# Patient Record
Sex: FEMALE | Race: BLACK OR AFRICAN AMERICAN | Employment: FULL TIME | URBAN - METROPOLITAN AREA
[De-identification: names, ages, dates, MRNs, and addresses within clinical notes are randomized per-mention and may not be internally consistent; named-entity substitution may affect disease eponyms.]

---

## 2017-01-19 ENCOUNTER — OP HISTORICAL/CONVERTED ENCOUNTER (OUTPATIENT)
Dept: OTHER | Age: 42
End: 2017-01-19

## 2017-01-25 ENCOUNTER — ED HISTORICAL/CONVERTED ENCOUNTER (OUTPATIENT)
Dept: OTHER | Age: 42
End: 2017-01-25

## 2017-08-16 ENCOUNTER — OP HISTORICAL/CONVERTED ENCOUNTER (OUTPATIENT)
Dept: OTHER | Age: 42
End: 2017-08-16

## 2017-10-14 ENCOUNTER — ED HISTORICAL/CONVERTED ENCOUNTER (OUTPATIENT)
Dept: OTHER | Age: 42
End: 2017-10-14

## 2019-08-06 ENCOUNTER — OP HISTORICAL/CONVERTED ENCOUNTER (OUTPATIENT)
Dept: OTHER | Age: 44
End: 2019-08-06

## 2019-08-08 ENCOUNTER — OP HISTORICAL/CONVERTED ENCOUNTER (OUTPATIENT)
Dept: OTHER | Age: 44
End: 2019-08-08

## 2020-08-01 LAB
ALBUMIN SERPL-MCNC: 4 G/DL (ref 3.8–4.8)
ALBUMIN/GLOB SERPL: 1.3 {RATIO} (ref 1.2–2.2)
ALP SERPL-CCNC: 81 IU/L (ref 39–117)
ALT SERPL-CCNC: 28 IU/L (ref 0–32)
AST SERPL-CCNC: 19 IU/L (ref 0–40)
BASOPHILS # BLD AUTO: 0 X10E3/UL (ref 0–0.2)
BASOPHILS NFR BLD AUTO: 0 %
BILIRUB SERPL-MCNC: 0.3 MG/DL (ref 0–1.2)
BUN SERPL-MCNC: 13 MG/DL (ref 6–24)
BUN/CREAT SERPL: 15 (ref 9–23)
CALCIUM SERPL-MCNC: 8.9 MG/DL (ref 8.7–10.2)
CHLORIDE SERPL-SCNC: 102 MMOL/L (ref 96–106)
CHOLEST SERPL-MCNC: 193 MG/DL (ref 100–199)
CO2 SERPL-SCNC: 24 MMOL/L (ref 20–29)
CREAT SERPL-MCNC: 0.88 MG/DL (ref 0.57–1)
EOSINOPHIL # BLD AUTO: 0.2 X10E3/UL (ref 0–0.4)
EOSINOPHIL NFR BLD AUTO: 2 %
ERYTHROCYTE [DISTWIDTH] IN BLOOD BY AUTOMATED COUNT: 17.2 % (ref 11.7–15.4)
GLOBULIN SER CALC-MCNC: 3.2 G/DL (ref 1.5–4.5)
GLUCOSE SERPL-MCNC: 110 MG/DL (ref 65–99)
HBA1C MFR BLD: 6.4 % (ref 4.8–5.6)
HCT VFR BLD AUTO: 38.5 % (ref 34–46.6)
HDLC SERPL-MCNC: 63 MG/DL
HGB BLD-MCNC: 12.1 G/DL (ref 11.1–15.9)
IMM GRANULOCYTES # BLD AUTO: 0 X10E3/UL (ref 0–0.1)
IMM GRANULOCYTES NFR BLD AUTO: 0 %
LDLC SERPL CALC-MCNC: 114 MG/DL (ref 0–99)
LDLC/HDLC SERPL: 1.8 RATIO (ref 0–3.2)
LYMPHOCYTES # BLD AUTO: 2.1 X10E3/UL (ref 0.7–3.1)
LYMPHOCYTES NFR BLD AUTO: 26 %
MCH RBC QN AUTO: 26.5 PG (ref 26.6–33)
MCHC RBC AUTO-ENTMCNC: 31.4 G/DL (ref 31.5–35.7)
MCV RBC AUTO: 84 FL (ref 79–97)
MONOCYTES # BLD AUTO: 0.6 X10E3/UL (ref 0.1–0.9)
MONOCYTES NFR BLD AUTO: 7 %
NEUTROPHILS # BLD AUTO: 5.1 X10E3/UL (ref 1.4–7)
NEUTROPHILS NFR BLD AUTO: 65 %
PLATELET # BLD AUTO: 237 X10E3/UL (ref 150–450)
POTASSIUM SERPL-SCNC: 4.4 MMOL/L (ref 3.5–5.2)
PROT SERPL-MCNC: 7.2 G/DL (ref 6–8.5)
RBC # BLD AUTO: 4.56 X10E6/UL (ref 3.77–5.28)
SODIUM SERPL-SCNC: 139 MMOL/L (ref 134–144)
TRIGL SERPL-MCNC: 80 MG/DL (ref 0–149)
VLDLC SERPL CALC-MCNC: 16 MG/DL (ref 5–40)
WBC # BLD AUTO: 8 X10E3/UL (ref 3.4–10.8)

## 2020-08-03 DIAGNOSIS — R73.03 PREDIABETES: Primary | ICD-10-CM

## 2020-08-03 RX ORDER — METFORMIN HYDROCHLORIDE 500 MG/1
1 TABLET, FILM COATED, EXTENDED RELEASE ORAL
Qty: 30 TAB | Refills: 2 | Status: SHIPPED | OUTPATIENT
Start: 2020-08-03 | End: 2020-11-01

## 2020-08-03 NOTE — PROGRESS NOTES
See results note      ICD-10-CM ICD-9-CM    1.  Prediabetes  R73.03 790.29 metFORMIN (GLUMETZA ER) 500 mg TG24 24 hour tablet

## 2020-08-04 PROBLEM — M25.569 KNEE PAIN: Status: ACTIVE | Noted: 2020-08-04

## 2020-08-04 PROBLEM — R11.0 NAUSEA: Status: ACTIVE | Noted: 2020-08-04

## 2020-08-04 PROBLEM — G45.9 TRANSIENT CEREBRAL ISCHEMIA: Status: ACTIVE | Noted: 2020-08-04

## 2020-08-04 PROBLEM — R60.9 EDEMA: Status: ACTIVE | Noted: 2020-08-04

## 2020-08-04 PROBLEM — B35.1 ONYCHOMYCOSIS: Status: ACTIVE | Noted: 2020-08-04

## 2020-08-04 PROBLEM — J45.909 ALLERGIC BRONCHITIS: Status: ACTIVE | Noted: 2020-08-04

## 2020-08-04 PROBLEM — R03.0 ELEVATED BLOOD PRESSURE READING: Status: ACTIVE | Noted: 2020-08-04

## 2020-08-04 PROBLEM — R63.5 ABNORMAL WEIGHT GAIN: Status: ACTIVE | Noted: 2020-08-04

## 2020-08-04 PROBLEM — R73.03 PREDIABETES: Status: ACTIVE | Noted: 2020-08-04

## 2020-08-04 PROBLEM — H66.90 OTITIS: Status: ACTIVE | Noted: 2020-08-04

## 2020-08-04 PROBLEM — N39.41 URGE INCONTINENCE OF URINE: Status: ACTIVE | Noted: 2020-08-04

## 2020-08-04 PROBLEM — J20.9 ACUTE BRONCHITIS: Status: ACTIVE | Noted: 2020-08-04

## 2020-08-04 PROBLEM — J18.9 PNEUMONIA: Status: ACTIVE | Noted: 2020-08-04

## 2020-08-04 PROBLEM — G47.33 OBSTRUCTIVE SLEEP APNEA SYNDROME: Status: ACTIVE | Noted: 2020-08-04

## 2020-08-04 PROBLEM — R51.9 HEADACHE: Status: ACTIVE | Noted: 2020-08-04

## 2020-08-04 PROBLEM — I10 ESSENTIAL HYPERTENSION: Status: ACTIVE | Noted: 2020-08-04

## 2020-08-04 PROBLEM — G43.909 MIGRAINE: Status: ACTIVE | Noted: 2020-08-04

## 2020-08-05 ENCOUNTER — OFFICE VISIT (OUTPATIENT)
Dept: INTERNAL MEDICINE CLINIC | Age: 45
End: 2020-08-05
Payer: COMMERCIAL

## 2020-08-05 VITALS
WEIGHT: 293 LBS | OXYGEN SATURATION: 98 % | DIASTOLIC BLOOD PRESSURE: 77 MMHG | BODY MASS INDEX: 44.41 KG/M2 | HEART RATE: 64 BPM | HEIGHT: 68 IN | TEMPERATURE: 97.6 F | SYSTOLIC BLOOD PRESSURE: 138 MMHG

## 2020-08-05 DIAGNOSIS — M54.50 CHRONIC RIGHT-SIDED LOW BACK PAIN WITHOUT SCIATICA: Primary | ICD-10-CM

## 2020-08-05 DIAGNOSIS — E66.01 OBESITY, MORBID (HCC): ICD-10-CM

## 2020-08-05 DIAGNOSIS — G89.29 CHRONIC RIGHT-SIDED LOW BACK PAIN WITHOUT SCIATICA: Primary | ICD-10-CM

## 2020-08-05 PROCEDURE — 99213 OFFICE O/P EST LOW 20 MIN: CPT | Performed by: INTERNAL MEDICINE

## 2020-08-05 RX ORDER — MELOXICAM 7.5 MG/1
TABLET ORAL
COMMUNITY
Start: 2020-06-15 | End: 2020-11-09 | Stop reason: ALTCHOICE

## 2020-08-05 RX ORDER — TOPIRAMATE 50 MG/1
TABLET, FILM COATED ORAL
COMMUNITY
Start: 2020-07-01 | End: 2020-10-07 | Stop reason: ALTCHOICE

## 2020-08-05 RX ORDER — TOLTERODINE TARTRATE 2 MG/1
TABLET, EXTENDED RELEASE ORAL
COMMUNITY
Start: 2020-05-07 | End: 2021-03-31 | Stop reason: SDUPTHER

## 2020-08-05 RX ORDER — TRIAMTERENE AND HYDROCHLOROTHIAZIDE 37.5; 25 MG/1; MG/1
1 CAPSULE ORAL DAILY
COMMUNITY
Start: 2020-07-03 | End: 2021-02-12 | Stop reason: ALTCHOICE

## 2020-08-05 RX ORDER — LORATADINE 10 MG/1
10 TABLET ORAL
COMMUNITY
End: 2021-03-31 | Stop reason: SDUPTHER

## 2020-08-05 RX ORDER — ALBUTEROL SULFATE 0.83 MG/ML
2.5 SOLUTION RESPIRATORY (INHALATION)
COMMUNITY
End: 2020-10-07 | Stop reason: SDUPTHER

## 2020-08-05 RX ORDER — ATORVASTATIN CALCIUM 10 MG/1
TABLET, FILM COATED ORAL
COMMUNITY
Start: 2020-07-01 | End: 2021-09-22 | Stop reason: ALTCHOICE

## 2020-08-05 NOTE — PROGRESS NOTES
Akiko Watts is a 40 y.o. female and presents with Hypertension; Cholesterol Problem; Asthma; Headache; and Side Pain    aleksandar bates visit was in July 10:  \"migraine   Recommendations on migraine prevention given, start sumatriptan. If this persists does not improve consider work-up for intracranial hypertension  Start SUMAtriptan 50 mg tablet Take 1 tablet as needed for headache, can repeat dose once, no more than 2 tablets per day   essential hypertension   No alcohol aspirin when she mentions, increase lisinopril from 5 mg to 10 mg. Educated on importance of low-salt, high potassium diet, weight loss, exercise. Start lisinopriL 10 mg tablet 1 tablet every day for 30 days   prediabetes   Check hemoglobin [id number]   Check comp. metabolic panel [id number]   Check CBC with differential/platelet   mixed hyperlipidemia   Check lipid panel with LDL/HDL [id number]    Globin A1c was 6.4%, cholesterol LDL mildly elevated, all other results within normal limits, results reviewed with her today. She says migraines are better, not as frequent, and sumatriptan helps. Blood pressure is better controlled, she has not been taking lisinopril, she is only taking Dyazide. Pain in the right side of the bback intermittent and sporadic, she has no idea what makes it worse. Someitimes she feels relief of it when she moves her bowels, every ohhter day, no straining, but she says not a lot comes out, no blood in the stools          Review of Systems  Review of Systems   Constitutional: Negative for chills, fatigue, fever and unexpected weight change. HENT: Negative for congestion, ear pain, sneezing and sore throat. Eyes: Negative for pain and discharge. Respiratory: Negative for cough, shortness of breath and wheezing. Cardiovascular: Negative for chest pain, palpitations and leg swelling. Gastrointestinal: Negative for abdominal pain, blood in stool, constipation and diarrhea.    Endocrine: Negative for polydipsia and polyuria. Genitourinary: Negative for difficulty urinating, dysuria, frequency, hematuria and urgency. Musculoskeletal: Positive for back pain. Negative for arthralgias and joint swelling. Skin: Negative for rash. Allergic/Immunologic: Negative for environmental allergies and food allergies. Neurological: Negative for dizziness, speech difficulty, weakness, light-headedness, numbness and headaches. Hematological: Negative for adenopathy. Psychiatric/Behavioral: Negative for behavioral problems (Depression), sleep disturbance and suicidal ideas. Past Medical History:   Diagnosis Date    Asthma     Hypertension     Obesity     S/P hysterectomy     May 2010     Past Surgical History:   Procedure Laterality Date    HX GYN      excision of bartholins gland    HX HYSTERECTOMY       Social History     Socioeconomic History    Marital status:      Spouse name: Not on file    Number of children: Not on file    Years of education: Not on file    Highest education level: Not on file   Tobacco Use    Smoking status: Never Smoker    Smokeless tobacco: Never Used   Substance and Sexual Activity    Alcohol use: Yes     Comment: wine for special occasions    Drug use: No     Family History   Problem Relation Age of Onset    Diabetes Maternal Grandmother     Coronary Artery Disease Maternal Grandmother     Diabetes Maternal Grandfather     Coronary Artery Disease Maternal Grandfather     Cancer Maternal Grandfather     Coronary Artery Disease Paternal Grandmother     Coronary Artery Disease Paternal Grandfather     Hypertension Father      Current Outpatient Medications   Medication Sig Dispense Refill    atorvastatin (LIPITOR) 10 mg tablet TAKE 1 TABLET (10 MG) BY MOUTH DAILY IN THE EVENING FOR HIGH CHOLESTEROL      triamterene-hydroCHLOROthiazide (DYAZIDE) 37.5-25 mg per capsule Take 1 Cap by mouth daily.       meloxicam (MOBIC) 7.5 mg tablet TAKE 1 TABLET BY MOUTH EVERY DAY      tolterodine (DETROL) 2 mg tablet TAKE 1 TABLET BY MOUTH EVERY DAY      topiramate (TOPAMAX) 50 mg tablet TAKE 1 TABLET BY MOUTH TWICE A DAY      loratadine (Claritin) 10 mg tablet Take 10 mg by mouth daily.  albuterol (PROVENTIL VENTOLIN) 2.5 mg /3 mL (0.083 %) nebu 2.5 mg by Nebulization route every four (4) hours as needed for Wheezing.  metFORMIN (GLUMETZA ER) 500 mg TG24 24 hour tablet Take 500 mg by mouth daily (with breakfast) for 90 days. 30 Tab 2    estradiol (ESTRACE) 1 mg tablet Take 1 mg by mouth daily. Allergies   Allergen Reactions    Penicillins Hives       Objective:  Visit Vitals  /77 (BP 1 Location: Right arm, BP Patient Position: Sitting)   Pulse 64   Temp 97.6 °F (36.4 °C) (Oral)   Ht 5' 8\" (1.727 m)   Wt (!) 362 lb 12.8 oz (164.6 kg)   SpO2 98% Comment: RA   BMI 55.16 kg/m²     Physical Exam:   Physical Exam  Constitutional:       General: She is not in acute distress. Appearance: Normal appearance. She is morbidly obese. HENT:      Head: Normocephalic and atraumatic. Mouth/Throat:      Mouth: Mucous membranes are moist.   Eyes:      Extraocular Movements: Extraocular movements intact. Conjunctiva/sclera: Conjunctivae normal.      Pupils: Pupils are equal, round, and reactive to light. Neck:      Musculoskeletal: Normal range of motion and neck supple. Cardiovascular:      Rate and Rhythm: Normal rate and regular rhythm. Pulses: Normal pulses. Heart sounds: Normal heart sounds. Pulmonary:      Effort: Pulmonary effort is normal.      Breath sounds: Normal breath sounds. Abdominal:      General: Abdomen is flat. Bowel sounds are normal. There is no distension. Palpations: Abdomen is soft. There is no mass. Tenderness: There is no abdominal tenderness. Musculoskeletal:         General: Tenderness (Right lower back lateral) present. No swelling or deformity. Right lower leg: No edema. Left lower leg: No edema. Lymphadenopathy:      Cervical: No cervical adenopathy. Skin:     General: Skin is warm and dry. Capillary Refill: Capillary refill takes less than 2 seconds. Coloration: Skin is not jaundiced or pale. Findings: No erythema or rash. Neurological:      General: No focal deficit present. Mental Status: She is alert and oriented to person, place, and time. Psychiatric:         Mood and Affect: Mood normal.         Behavior: Behavior normal.         Thought Content: Thought content normal.         Judgment: Judgment normal.          Results for orders placed or performed in visit on 07/31/20   CBC WITH AUTOMATED DIFF   Result Value Ref Range    WBC 8.0 3.4 - 10.8 x10E3/uL    RBC 4.56 3.77 - 5.28 x10E6/uL    HGB 12.1 11.1 - 15.9 g/dL    HCT 38.5 34.0 - 46.6 %    MCV 84 79 - 97 fL    MCH 26.5 (L) 26.6 - 33.0 pg    MCHC 31.4 (L) 31.5 - 35.7 g/dL    RDW 17.2 (H) 11.7 - 15.4 %    PLATELET 071 661 - 352 x10E3/uL    NEUTROPHILS 65 Not Estab. %    Lymphocytes 26 Not Estab. %    MONOCYTES 7 Not Estab. %    EOSINOPHILS 2 Not Estab. %    BASOPHILS 0 Not Estab. %    ABS. NEUTROPHILS 5.1 1.4 - 7.0 x10E3/uL    Abs Lymphocytes 2.1 0.7 - 3.1 x10E3/uL    ABS. MONOCYTES 0.6 0.1 - 0.9 x10E3/uL    ABS. EOSINOPHILS 0.2 0.0 - 0.4 x10E3/uL    ABS. BASOPHILS 0.0 0.0 - 0.2 x10E3/uL    IMMATURE GRANULOCYTES 0 Not Estab. %    ABS. IMM.  GRANS. 0.0 0.0 - 0.1 X29A6/VT   METABOLIC PANEL, COMPREHENSIVE   Result Value Ref Range    Glucose 110 (H) 65 - 99 mg/dL    BUN 13 6 - 24 mg/dL    Creatinine 0.88 0.57 - 1.00 mg/dL    GFR est non-AA 80 >59 mL/min/1.73    GFR est AA 92 >59 mL/min/1.73    BUN/Creatinine ratio 15 9 - 23    Sodium 139 134 - 144 mmol/L    Potassium 4.4 3.5 - 5.2 mmol/L    Chloride 102 96 - 106 mmol/L    CO2 24 20 - 29 mmol/L    Calcium 8.9 8.7 - 10.2 mg/dL    Protein, total 7.2 6.0 - 8.5 g/dL    Albumin 4.0 3.8 - 4.8 g/dL    GLOBULIN, TOTAL 3.2 1.5 - 4.5 g/dL    A-G Ratio 1.3 1.2 - 2.2    Bilirubin, total 0.3 0.0 - 1.2 mg/dL    Alk. phosphatase 81 39 - 117 IU/L    AST (SGOT) 19 0 - 40 IU/L    ALT (SGPT) 28 0 - 32 IU/L   LIPID PANEL WITH LDL/HDL RATIO   Result Value Ref Range    Cholesterol, total 193 100 - 199 mg/dL    Triglyceride 80 0 - 149 mg/dL    HDL Cholesterol 63 >39 mg/dL    VLDL, calculated 16 5 - 40 mg/dL    LDL, calculated 114 (H) 0 - 99 mg/dL    LDL/HDL Ratio 1.8 0.0 - 3.2 ratio   HEMOGLOBIN A1C W/O EAG   Result Value Ref Range    Hemoglobin A1c 6.4 (H) 4.8 - 5.6 %       Assessment/Plan:    Her back pain has been present for more than 2 years, she benefits from physical therapy, findings in the physical exam are compatible with musculoskeletal etiology, no need for imaging at all, I explained her. Counseled her on diet,1200 calorie diet given, recommended brisk walking 30 to 40 mins. goal to lose 5% of her weight in 3 months. Hypertension is well controlled, continue Dyazide with no changes. Since she has not been taking the lisinopril there is been no need to think about adding at this time. ICD-10-CM ICD-9-CM    1. Chronic right-sided low back pain without sciatica  M54.5 724.2 REFERRAL TO PHYSICAL THERAPY    G89.29 338.29    2. Obesity, morbid ([facility] [address]  E66.01 278.01      Orders Placed This Encounter    REFERRAL TO PHYSICAL THERAPY     Referral Priority:   Routine     Referral Type:   PT/OT/ST     Referral Reason:   Specialty Services Required     Number of Visits Requested:   1    atorvastatin (LIPITOR) 10 mg tablet     Sig: TAKE 1 TABLET (10 MG) BY MOUTH DAILY IN THE EVENING FOR HIGH CHOLESTEROL    triamterene-hydroCHLOROthiazide (DYAZIDE) 37.5-25 mg per capsule     Sig: Take 1 Cap by mouth daily.     meloxicam (MOBIC) 7.5 mg tablet     Sig: TAKE 1 TABLET BY MOUTH EVERY DAY    tolterodine (DETROL) 2 mg tablet     Sig: TAKE 1 TABLET BY MOUTH EVERY DAY    topiramate (TOPAMAX) 50 mg tablet     Sig: TAKE 1 TABLET BY MOUTH TWICE A DAY    loratadine (Claritin) 10 mg tablet     Sig: Take 10 mg by mouth daily.  albuterol (PROVENTIL VENTOLIN) 2.5 mg /3 mL (0.083 %) nebu     Si.5 mg by Nebulization route every four (4) hours as needed for Wheezing. There are no Patient Instructions on file for this visit. Follow-up and Dispositions    · Return in about 3 months (around 2020).

## 2020-10-07 ENCOUNTER — VIRTUAL VISIT (OUTPATIENT)
Dept: INTERNAL MEDICINE CLINIC | Age: 45
End: 2020-10-07
Payer: COMMERCIAL

## 2020-10-07 DIAGNOSIS — J45.41 MODERATE PERSISTENT ASTHMA WITH ACUTE EXACERBATION: Primary | ICD-10-CM

## 2020-10-07 PROCEDURE — 99213 OFFICE O/P EST LOW 20 MIN: CPT | Performed by: INTERNAL MEDICINE

## 2020-10-07 RX ORDER — PREDNISONE 50 MG/1
50 TABLET ORAL DAILY
Qty: 7 TAB | Refills: 0 | Status: SHIPPED | OUTPATIENT
Start: 2020-10-07 | End: 2020-10-14

## 2020-10-07 RX ORDER — ALBUTEROL SULFATE 90 UG/1
1 AEROSOL, METERED RESPIRATORY (INHALATION)
Qty: 1 INHALER | Refills: 3 | Status: SHIPPED | OUTPATIENT
Start: 2020-10-07

## 2020-10-07 RX ORDER — ALBUTEROL SULFATE 0.83 MG/ML
2.5 SOLUTION RESPIRATORY (INHALATION)
Qty: 30 NEBULE | Refills: 2 | Status: SHIPPED | OUTPATIENT
Start: 2020-10-07 | End: 2022-01-28 | Stop reason: SDUPTHER

## 2020-10-07 NOTE — PROGRESS NOTES
Chief Complaint   Patient presents with    Asthma    Breathing Problem     Pt states that her asthma has been acting up since Sunday. She said that she is required to wear a mask at work and it is making her asthma worse. She has been doing her albuterol neb every 4 hours and has run out of albuterol.

## 2020-10-07 NOTE — PATIENT INSTRUCTIONS

## 2020-10-07 NOTE — PROGRESS NOTES
Fawn Dowd is a 40 y.o. female and presents with Asthma and Breathing Problem    THIS VISIT WAS COMPLETED VIRTUALLY VIA DOXY. ME WITH PATIENTS CONSENT IN THE SETTING OF CORONAVIRUS PANDEMIC     THIS VISIT WAS COMPLETED VIRTUALLY VIA DOXY. ME WITH PATIENTS CONSENT IN THE SETTING OF CORONAVIRUS PANDEMIC     She says since 4 days ago her asthma is not controlled. She says she feels wearing a facemask at work makes it worse. She has been using the nebulizer every 4 hrous since 3 days ago. She feels chest tightness, having wheezing daily, she says than once she uses the nebulizer her wheezing improves. Her main issue is the chest tightness and sob, little dry cough, not a lot she says, no fever, no chills, no malaise, no myalgias. She's using the symbicort once a day only        Review of Systems  Review of Systems   Constitutional: Negative for chills, fatigue, fever and unexpected weight change. HENT: Negative for congestion, ear pain, sneezing and sore throat. Eyes: Negative for pain and discharge. Respiratory: Negative for cough, shortness of breath and wheezing. Cardiovascular: Negative for chest pain, palpitations and leg swelling. Gastrointestinal: Negative for abdominal pain, blood in stool, constipation and diarrhea. Endocrine: Negative for polydipsia and polyuria. Genitourinary: Negative for difficulty urinating, dysuria, frequency, hematuria and urgency. Musculoskeletal: Negative for arthralgias, back pain and joint swelling. Skin: Negative for rash. Allergic/Immunologic: Negative for environmental allergies and food allergies. Neurological: Negative for dizziness, speech difficulty, weakness, light-headedness, numbness and headaches. Hematological: Negative for adenopathy. Psychiatric/Behavioral: Negative for behavioral problems (Depression), sleep disturbance and suicidal ideas.           Past Medical History:   Diagnosis Date    Asthma     Hypertension     Obesity     S/P hysterectomy     May 2010     Past Surgical History:   Procedure Laterality Date    HX GYN      excision of bartholins gland    HX HYSTERECTOMY       Social History     Socioeconomic History    Marital status:      Spouse name: Not on file    Number of children: Not on file    Years of education: Not on file    Highest education level: Not on file   Tobacco Use    Smoking status: Never Smoker    Smokeless tobacco: Never Used   Substance and Sexual Activity    Alcohol use: Yes     Comment: wine for special occasions    Drug use: No     Family History   Problem Relation Age of Onset    Diabetes Maternal Grandmother     Coronary Artery Disease Maternal Grandmother     Diabetes Maternal Grandfather     Coronary Artery Disease Maternal Grandfather     Cancer Maternal Grandfather     Coronary Artery Disease Paternal Grandmother     Coronary Artery Disease Paternal Grandfather     Hypertension Father      Current Outpatient Medications   Medication Sig Dispense Refill    albuterol (PROVENTIL HFA, VENTOLIN HFA, PROAIR HFA) 90 mcg/actuation inhaler Take 1 Puff by inhalation every six (6) hours as needed for Wheezing. 1 Inhaler 3    predniSONE (DELTASONE) 50 mg tablet Take 1 Tab by mouth daily for 7 days. 7 Tab 0    albuterol (PROVENTIL VENTOLIN) 2.5 mg /3 mL (0.083 %) nebu 3 mL by Nebulization route every six (6) hours as needed for Wheezing or Shortness of Breath for up to 90 days. 30 Nebule 2    atorvastatin (LIPITOR) 10 mg tablet TAKE 1 TABLET (10 MG) BY MOUTH DAILY IN THE EVENING FOR HIGH CHOLESTEROL      triamterene-hydroCHLOROthiazide (DYAZIDE) 37.5-25 mg per capsule Take 1 Cap by mouth daily.  meloxicam (MOBIC) 7.5 mg tablet TAKE 1 TABLET BY MOUTH EVERY DAY      tolterodine (DETROL) 2 mg tablet TAKE 1 TABLET BY MOUTH EVERY DAY      loratadine (Claritin) 10 mg tablet Take 10 mg by mouth daily.       metFORMIN (GLUMETZA ER) 500 mg TG24 24 hour tablet Take 500 mg by mouth daily (with breakfast) for 90 days. 30 Tab 2     Allergies   Allergen Reactions    Penicillins Hives       Objective: There were no vitals taken for this visit. Physical Exam:   Physical Exam  Vitals signs and nursing note reviewed. Constitutional:       Appearance: Normal appearance. HENT:      Head: Normocephalic and atraumatic. Eyes:      General: No scleral icterus. Conjunctiva/sclera: Conjunctivae normal.      Pupils: Pupils are equal, round, and reactive to light. Neck:      Musculoskeletal: Normal range of motion. Skin:     Coloration: Skin is not jaundiced or pale. Findings: No rash. Neurological:      Mental Status: She is alert and oriented to person, place, and time. Psychiatric:         Mood and Affect: Mood normal.         Behavior: Behavior normal.         Thought Content: Thought content normal.         Judgment: Judgment normal.          Results for orders placed or performed in visit on 07/31/20   CBC WITH AUTOMATED DIFF   Result Value Ref Range    WBC 8.0 3.4 - 10.8 x10E3/uL    RBC 4.56 3.77 - 5.28 x10E6/uL    HGB 12.1 11.1 - 15.9 g/dL    HCT 38.5 34.0 - 46.6 %    MCV 84 79 - 97 fL    MCH 26.5 (L) 26.6 - 33.0 pg    MCHC 31.4 (L) 31.5 - 35.7 g/dL    RDW 17.2 (H) 11.7 - 15.4 %    PLATELET 896 884 - 219 x10E3/uL    NEUTROPHILS 65 Not Estab. %    Lymphocytes 26 Not Estab. %    MONOCYTES 7 Not Estab. %    EOSINOPHILS 2 Not Estab. %    BASOPHILS 0 Not Estab. %    ABS. NEUTROPHILS 5.1 1.4 - 7.0 x10E3/uL    Abs Lymphocytes 2.1 0.7 - 3.1 x10E3/uL    ABS. MONOCYTES 0.6 0.1 - 0.9 x10E3/uL    ABS. EOSINOPHILS 0.2 0.0 - 0.4 x10E3/uL    ABS. BASOPHILS 0.0 0.0 - 0.2 x10E3/uL    IMMATURE GRANULOCYTES 0 Not Estab. %    ABS. IMM.  GRANS. 0.0 0.0 - 0.1 [id number]/SN   METABOLIC PANEL, COMPREHENSIVE   Result Value Ref Range    Glucose 110 (H) 65 - 99 mg/dL    BUN 13 6 - 24 mg/dL    Creatinine 0.88 0.57 - 1.00 mg/dL    GFR est non-AA 80 >59 mL/min/1.73    GFR est AA 92 >59 mL/min/1.73    BUN/Creatinine ratio 15 9 - 23    Sodium 139 134 - 144 mmol/L    Potassium 4.4 3.5 - 5.2 mmol/L    Chloride 102 96 - 106 mmol/L    CO2 24 20 - 29 mmol/L    Calcium 8.9 8.7 - 10.2 mg/dL    Protein, total 7.2 6.0 - 8.5 g/dL    Albumin 4.0 3.8 - 4.8 g/dL    GLOBULIN, TOTAL 3.2 1.5 - 4.5 g/dL    A-G Ratio 1.3 1.2 - 2.2    Bilirubin, total 0.3 0.0 - 1.2 mg/dL    Alk. phosphatase 81 39 - 117 IU/L    AST (SGOT) 19 0 - 40 IU/L    ALT (SGPT) 28 0 - 32 IU/L   LIPID PANEL WITH LDL/HDL RATIO   Result Value Ref Range    Cholesterol, total 193 100 - 199 mg/dL    Triglyceride 80 0 - 149 mg/dL    HDL Cholesterol 63 >39 mg/dL    VLDL, calculated 16 5 - 40 mg/dL    LDL, calculated 114 (H) 0 - 99 mg/dL    LDL/HDL Ratio 1.8 0.0 - 3.2 ratio   HEMOGLOBIN A1C W/O EAG   Result Value Ref Range    Hemoglobin A1c 6.4 (H) 4.8 - 5.6 %       Assessment/Plan:    Acute asthma exacerbation judging by her symptoms, do short course of steroids, educated about proper se of symbicort and to use it twice a day, needs refill of albuterol. Signs of alarm provided       ICD-10-CM ICD-9-CM    1. Moderate persistent asthma with acute exacerbation  J45.41 493.92 albuterol (PROVENTIL HFA, VENTOLIN HFA, PROAIR HFA) 90 mcg/actuation inhaler      predniSONE (DELTASONE) 50 mg tablet      albuterol (PROVENTIL VENTOLIN) 2.5 mg /3 mL (0.083 %) nebu     Orders Placed This Encounter    albuterol (PROVENTIL HFA, VENTOLIN HFA, PROAIR HFA) 90 mcg/actuation inhaler     Sig: Take 1 Puff by inhalation every six (6) hours as needed for Wheezing. Dispense:  1 Inhaler     Refill:  3    predniSONE (DELTASONE) 50 mg tablet     Sig: Take 1 Tab by mouth daily for 7 days. Dispense:  7 Tab     Refill:  0    albuterol (PROVENTIL VENTOLIN) 2.5 mg /3 mL (0.083 %) nebu     Sig: 3 mL by Nebulization route every six (6) hours as needed for Wheezing or Shortness of Breath for up to 90 days.      Dispense:  30 Nebule     Refill:  2       Patient Instructions        Asthma Attack: Care Instructions  Your Care Instructions     During an asthma attack, the airways swell and narrow. This makes it hard to breathe. Severe asthma attacks can be life-threatening, but you can help prevent them by keeping your asthma under control and treating symptoms before they get bad. Symptoms include being short of breath, having chest tightness, coughing, and wheezing. Noting and treating these symptoms can also help you avoid future trips to the emergency room. The doctor has checked you carefully, but problems can develop later. If you notice any problems or new symptoms, get medical treatment right away. Follow-up care is a key part of your treatment and safety. Be sure to make and go to all appointments, and call your doctor if you are having problems. It's also a good idea to know your test results and keep a list of the medicines you take. How can you care for yourself at home? · Follow your asthma action plan to prevent and treat attacks. If you don't have an asthma action plan, work with your doctor to create one. · Take your asthma medicines exactly as prescribed. Talk to your doctor right away if you have any questions about how to take them. ? Use your quick-relief medicine when you have symptoms of an attack. Quick-relief medicine is usually an albuterol inhaler. Some people need to use quick-relief medicine before they exercise. ? Take your controller medicine every day, not just when you have symptoms. Controller medicine is usually an inhaled corticosteroid. The goal is to prevent problems before they occur. Don't use your controller medicine to treat an attack that has already started. It doesn't work fast enough to help. ? If your doctor prescribed corticosteroid pills to use during an attack, take them exactly as prescribed. It may take hours for the pills to work, but they may make the episode shorter and help you breathe better. ?  Keep your quick-relief medicine with you at all times.  · Talk to your doctor before using other medicines. Some medicines, such as aspirin, can cause asthma attacks in some people. · If you have a peak flow meter, use it to check how well you are breathing. This can help you predict when an asthma attack is going to occur. Then you can take medicine to prevent the asthma attack or make it less severe. · Do not smoke or allow others to smoke around you. Avoid smoky places. Smoking makes asthma worse. If you need help quitting, talk to your doctor about stop-smoking programs and medicines. These can increase your chances of quitting for good. · Learn what triggers an asthma attack for you, and avoid the triggers when you can. Common triggers include colds, smoke, air pollution, dust, pollen, mold, pets, cockroaches, stress, and cold air. · Avoid colds and the flu. Get a pneumococcal vaccine shot. If you have had one before, ask your doctor if you need a second dose. Get a flu vaccine every fall. If you must be around people with colds or the flu, wash your hands often. When should you call for help? [id number] anytime you think you may need emergency care. For example, call if:  · You have severe trouble breathing. Call your doctor now or seek immediate medical care if:  · Your symptoms do not get better after you have followed your asthma action plan. · You have new or worse trouble breathing. · Your coughing and wheezing get worse. · You cough up dark brown or bloody mucus (sputum). · You have a new or higher fever. Watch closely for changes in your health, and be sure to contact your doctor if:  · You need to use quick-relief medicine on more than 2 days a week (unless it is just for exercise). · You cough more deeply or more often, especially if you notice more mucus or a change in the color of your mucus. · You are not getting better as expected. Where can you learn more?   Go to http://www.gray.com/  Enter [id number] in the search box to learn more about \"Asthma Attack: Care Instructions. \"  Current as of: February 24, 2020               Content Version: 12.5  © 2006-2020 [employer]. Care instructions adapted under license by [employer] (which disclaims liability or warranty for this information). If you have questions about a medical condition or this instruction, always ask your healthcare professional. [place] [address] any warranty or liability for your use of this information.

## 2020-10-07 NOTE — LETTER
NOTIFICATION RETURN TO WORK / SCHOOL 
 
10/7/2020 4:22 PM 
 
Ms. [facility] [phone] To Whom It May Concern: 
 
[facility] is currently under the care of [address] [place] [address]. She will return to workon: 10/12/2020 If there are questions or concerns please have the patient contact our office. Sincerely, Chang Deutsch MD

## 2020-11-09 ENCOUNTER — VIRTUAL VISIT (OUTPATIENT)
Dept: INTERNAL MEDICINE CLINIC | Age: 45
End: 2020-11-09
Payer: COMMERCIAL

## 2020-11-09 DIAGNOSIS — J45.41 MODERATE PERSISTENT ASTHMA WITH ACUTE EXACERBATION: Primary | ICD-10-CM

## 2020-11-09 DIAGNOSIS — E66.01 OBESITY, MORBID (HCC): ICD-10-CM

## 2020-11-09 PROCEDURE — 99213 OFFICE O/P EST LOW 20 MIN: CPT | Performed by: INTERNAL MEDICINE

## 2020-11-09 RX ORDER — FLUTICASONE PROPIONATE 110 UG/1
2 AEROSOL, METERED RESPIRATORY (INHALATION) EVERY 12 HOURS
Qty: 1 INHALER | Refills: 1 | Status: SHIPPED | OUTPATIENT
Start: 2020-11-09 | End: 2022-01-28 | Stop reason: ALTCHOICE

## 2020-11-09 RX ORDER — MELOXICAM 7.5 MG/1
TABLET ORAL
COMMUNITY
Start: 2020-07-13 | End: 2020-11-09 | Stop reason: ALTCHOICE

## 2020-11-09 RX ORDER — METFORMIN HYDROCHLORIDE 500 MG/1
TABLET ORAL
COMMUNITY
Start: 2020-01-30 | End: 2021-03-12 | Stop reason: SDUPTHER

## 2020-11-09 NOTE — PROGRESS NOTES
Ninoska Mullins is a 40 y.o. female and presents with Asthma (asthma) and Obesity    THIS VISIT WAS COMPLETED VIRTUALLY VIA DOXY. ME WITH PATIENTS CONSENT IN THE SETTING OF CORONAVIRUS PANDEMIC     Last visit she was having asthma exacerbation, we did a short course of steroid, using albuterol as needed, once or twice a week, no night time symptoms. Last episode was yesterday, she used the albuterol a couple of times yesterday. Symptoms are not as frequent (wheezing, chest tightness), but still present as mentioned. She's concerned about not being able to lose weight, currently she eats Chicken, turkey, fish, bake or broiled food, eating more vegetables, decreasing starch portions. She eats an oatmeal snack or fruit, or celery with peanut butter. She has changed her habits since August    Last time she chedked her bp was a week ago, she says it was fine 126/85, taking the dyazide    Review of Systems  Review of Systems   Constitutional: Negative for chills, fatigue, fever and unexpected weight change. HENT: Negative for congestion, ear pain, sneezing and sore throat. Eyes: Negative for pain and discharge. Respiratory: Negative for cough, shortness of breath and wheezing. Cardiovascular: Negative for chest pain, palpitations and leg swelling. Gastrointestinal: Negative for abdominal pain, blood in stool, constipation and diarrhea. Endocrine: Negative for polydipsia and polyuria. Genitourinary: Negative for difficulty urinating, dysuria, frequency, hematuria and urgency. Musculoskeletal: Negative for arthralgias, back pain and joint swelling. Skin: Negative for rash. Allergic/Immunologic: Negative for environmental allergies and food allergies. Neurological: Negative for dizziness, speech difficulty, weakness, light-headedness, numbness and headaches. Hematological: Negative for adenopathy.    Psychiatric/Behavioral: Negative for behavioral problems (Depression), sleep disturbance and suicidal ideas. Past Medical History:   Diagnosis Date    Asthma     Hypertension     Obesity     S/P hysterectomy     May 2010     Past Surgical History:   Procedure Laterality Date    HX GYN      excision of bartholins gland    HX HYSTERECTOMY       Social History     Socioeconomic History    Marital status:      Spouse name: Not on file    Number of children: Not on file    Years of education: Not on file    Highest education level: Not on file   Tobacco Use    Smoking status: Never Smoker    Smokeless tobacco: Never Used   Substance and Sexual Activity    Alcohol use: Yes     Comment: wine for special occasions    Drug use: No     Family History   Problem Relation Age of Onset    Diabetes Maternal Grandmother     Coronary Artery Disease Maternal Grandmother     Diabetes Maternal Grandfather     Coronary Artery Disease Maternal Grandfather     Cancer Maternal Grandfather     Coronary Artery Disease Paternal Grandmother     Coronary Artery Disease Paternal Grandfather     Hypertension Father      Current Outpatient Medications   Medication Sig Dispense Refill    metFORMIN (GLUCOPHAGE) 500 mg tablet metformin 500 mg tablet      fluticasone propionate (FLOVENT HFA) 110 mcg/actuation inhaler Take 2 Puffs by inhalation every twelve (12) hours. 1 Inhaler 1    albuterol (PROVENTIL HFA, VENTOLIN HFA, PROAIR HFA) 90 mcg/actuation inhaler Take 1 Puff by inhalation every six (6) hours as needed for Wheezing. 1 Inhaler 3    albuterol (PROVENTIL VENTOLIN) 2.5 mg /3 mL (0.083 %) nebu 3 mL by Nebulization route every six (6) hours as needed for Wheezing or Shortness of Breath for up to 90 days. 30 Nebule 2    atorvastatin (LIPITOR) 10 mg tablet TAKE 1 TABLET (10 MG) BY MOUTH DAILY IN THE EVENING FOR HIGH CHOLESTEROL      triamterene-hydroCHLOROthiazide (DYAZIDE) 37.5-25 mg per capsule Take 1 Cap by mouth daily.       tolterodine (DETROL) 2 mg tablet TAKE 1 TABLET BY MOUTH EVERY DAY  loratadine (Claritin) 10 mg tablet Take 10 mg by mouth daily.  lisinopriL 1 mg/mL soln lisinopril       Allergies   Allergen Reactions    Penicillins Hives       Objective: There were no vitals taken for this visit. Physical Exam:   Physical Exam  Vitals signs and nursing note reviewed. Constitutional:       Appearance: Normal appearance. HENT:      Head: Normocephalic and atraumatic. Eyes:      General: No scleral icterus. Conjunctiva/sclera: Conjunctivae normal.      Pupils: Pupils are equal, round, and reactive to light. Neck:      Musculoskeletal: Normal range of motion. Skin:     Coloration: Skin is not jaundiced or pale. Findings: No rash. Neurological:      Mental Status: She is alert and oriented to person, place, and time. Psychiatric:         Mood and Affect: Mood normal.         Behavior: Behavior normal.         Thought Content: Thought content normal.         Judgment: Judgment normal.          Results for orders placed or performed in visit on 07/31/20   CBC WITH AUTOMATED DIFF   Result Value Ref Range    WBC 8.0 3.4 - 10.8 x10E3/uL    RBC 4.56 3.77 - 5.28 x10E6/uL    HGB 12.1 11.1 - 15.9 g/dL    HCT 38.5 34.0 - 46.6 %    MCV 84 79 - 97 fL    MCH 26.5 (L) 26.6 - 33.0 pg    MCHC 31.4 (L) 31.5 - 35.7 g/dL    RDW 17.2 (H) 11.7 - 15.4 %    PLATELET 476 754 - 236 x10E3/uL    NEUTROPHILS 65 Not Estab. %    Lymphocytes 26 Not Estab. %    MONOCYTES 7 Not Estab. %    EOSINOPHILS 2 Not Estab. %    BASOPHILS 0 Not Estab. %    ABS. NEUTROPHILS 5.1 1.4 - 7.0 x10E3/uL    Abs Lymphocytes 2.1 0.7 - 3.1 x10E3/uL    ABS. MONOCYTES 0.6 0.1 - 0.9 x10E3/uL    ABS. EOSINOPHILS 0.2 0.0 - 0.4 x10E3/uL    ABS. BASOPHILS 0.0 0.0 - 0.2 x10E3/uL    IMMATURE GRANULOCYTES 0 Not Estab. %    ABS. IMM.  GRANS. 0.0 0.0 - 0.1 C44T3/HB   METABOLIC PANEL, COMPREHENSIVE   Result Value Ref Range    Glucose 110 (H) 65 - 99 mg/dL    BUN 13 6 - 24 mg/dL    Creatinine 0.88 0.57 - 1.00 mg/dL    GFR est non-AA 80 >59 mL/min/1.73    GFR est AA 92 >59 mL/min/1.73    BUN/Creatinine ratio 15 9 - 23    Sodium 139 134 - 144 mmol/L    Potassium 4.4 3.5 - 5.2 mmol/L    Chloride 102 96 - 106 mmol/L    CO2 24 20 - 29 mmol/L    Calcium 8.9 8.7 - 10.2 mg/dL    Protein, total 7.2 6.0 - 8.5 g/dL    Albumin 4.0 3.8 - 4.8 g/dL    GLOBULIN, TOTAL 3.2 1.5 - 4.5 g/dL    A-G Ratio 1.3 1.2 - 2.2    Bilirubin, total 0.3 0.0 - 1.2 mg/dL    Alk. phosphatase 81 39 - 117 IU/L    AST (SGOT) 19 0 - 40 IU/L    ALT (SGPT) 28 0 - 32 IU/L   LIPID PANEL WITH LDL/HDL RATIO   Result Value Ref Range    Cholesterol, total 193 100 - 199 mg/dL    Triglyceride 80 0 - 149 mg/dL    HDL Cholesterol 63 >39 mg/dL    VLDL, calculated 16 5 - 40 mg/dL    LDL, calculated 114 (H) 0 - 99 mg/dL    LDL/HDL Ratio 1.8 0.0 - 3.2 ratio   HEMOGLOBIN A1C W/O EAG   Result Value Ref Range    Hemoglobin A1c 6.4 (H) 4.8 - 5.6 %       Assessment/Plan:    She is much better in terms of asthma symptoms, but still having symptoms couple of times per week, she mentions the symptoms appear mostly with strong odors will smoke. The goal would be to achieve no symptoms and no need to use the albuterol or very seldom, twice a week is still good control. Start ICS, taught her proper technique of inhaler use, encouraged Christin mouth rinsing after using. Discussed about weight loss, she is concerned about not being able to lose weight despite of having changed her eating habits, she needs to exercise, will give her a 1200-calorie diet and see if with controlling calorie intake she can achieve more weight loss. ICD-10-CM ICD-9-CM    1. Moderate persistent asthma with acute exacerbation  J45.41 493.92 fluticasone propionate (FLOVENT HFA) 110 mcg/actuation inhaler   2.  Obesity, morbid ([address]  E66.01 278.01      Orders Placed This Encounter    lisinopriL 1 mg/mL soln     Sig: lisinopril    DISCONTD: meloxicam-irritant,cntr irr 2 15 mg kit     Sig: meloxicam 15 mg tablet    metFORMIN (GLUCOPHAGE) 500 mg tablet     Sig: metformin 500 mg tablet    DISCONTD: meloxicam (MOBIC) 7.5 mg tablet     Sig: TAKE 1 TABLET BY MOUTH EVERY DAY    fluticasone propionate (FLOVENT HFA) 110 mcg/actuation inhaler     Sig: Take 2 Puffs by inhalation every twelve (12) hours. Dispense:  1 Inhaler     Refill:  1     increase physical activity, bring BP log to office visit, follow 1200 calorie diet   There are no Patient Instructions on file for this visit. Follow-up and Dispositions    · Return in about 2 months (around 1/9/2021). Bolivar Alvarez is a 40 y.o. female being evaluated by a Virtual Visit (video visit) encounter to address concerns as mentioned above. A caregiver was present when appropriate. Due to this being a TeleHealth encounter (During ZYFNL-29 public health emergency), evaluation of the following organ systems was limited: Vitals/Constitutional/EENT/Resp/CV/GI//MS/Neuro/Skin/Heme-Lymph-Imm. Pursuant to the emergency declaration under the [facility], [address] [place] [address] authority and the [address] and [employer] Act, this Virtual Visit was conducted with patient's (and/or legal guardian's) consent, to reduce the risk of exposure to COVID-19 and provide necessary medical care. Services were provided through a video synchronous discussion virtually to substitute for in-person encounter. --Inna Wilkerson MD on 11/10/2020 at 1:36 PM    An electronic signature was used to authenticate this note.

## 2021-02-12 ENCOUNTER — VIRTUAL VISIT (OUTPATIENT)
Dept: INTERNAL MEDICINE CLINIC | Age: 46
End: 2021-02-12
Payer: COMMERCIAL

## 2021-02-12 DIAGNOSIS — E11.9 TYPE 2 DIABETES MELLITUS WITHOUT COMPLICATION, WITHOUT LONG-TERM CURRENT USE OF INSULIN (HCC): ICD-10-CM

## 2021-02-12 DIAGNOSIS — J45.41 MODERATE PERSISTENT ASTHMA WITH ACUTE EXACERBATION: ICD-10-CM

## 2021-02-12 DIAGNOSIS — R22.2 PALPABLE MASS OF LOWER BACK: ICD-10-CM

## 2021-02-12 DIAGNOSIS — E66.01 MORBID OBESITY WITH BMI OF 50.0-59.9, ADULT (HCC): Primary | ICD-10-CM

## 2021-02-12 PROCEDURE — 99213 OFFICE O/P EST LOW 20 MIN: CPT | Performed by: INTERNAL MEDICINE

## 2021-02-12 NOTE — PROGRESS NOTES
Katelyn Garcia is a 45 y.o. female and presents with Follow-up, Weight Loss, and Other (foul smelling urine)    She says sometimes when she urinates it smells \"like rotten eggs\" for about month, no vaginal discharge, no urgency, no frequency. She's using the detrol ordered by urology. She says she feels a knot in the right side of the lower back, she says she can feel the knot that she feels moving, it's tender.     Morbid obesity: She says she has been following the 1200 calorie diet, stopped eating red meats, all baked foods, being careful with carbohydrates, not drinking any sodas, and she can not lose any weight, she feels everything hurts when she's standing up, she has never been this big in her life after histerectomy. It's hard for her to walk, she feels out of breath walking, she feels this is already taking a toll in her quality of life.     Asthma: She says she's doing great, she's using the flovent, this has helped.     DM: She's not taking metformin every day because she feels bloated, has stomach pain when she takes it.         Review of Systems  Review of Systems   Constitutional: Negative for chills, fatigue, fever and unexpected weight change.   HENT: Negative for congestion, ear pain, sneezing and sore throat.    Eyes: Negative for pain and discharge.   Respiratory: Negative for cough, shortness of breath and wheezing.    Cardiovascular: Negative for chest pain, palpitations and leg swelling.   Gastrointestinal: Negative for abdominal pain, blood in stool, constipation and diarrhea.   Endocrine: Negative for polydipsia and polyuria.   Genitourinary: Negative for difficulty urinating, dysuria, frequency, hematuria and urgency.   Musculoskeletal: Negative for arthralgias, back pain and joint swelling.   Skin: Negative for rash.   Allergic/Immunologic: Negative for environmental allergies and food allergies.   Neurological: Negative for dizziness, speech difficulty, weakness, light-headedness,  numbness and headaches. Hematological: Negative for adenopathy. Psychiatric/Behavioral: Negative for behavioral problems (Depression), sleep disturbance and suicidal ideas. Past Medical History:   Diagnosis Date    Asthma     Hypertension     Obesity     S/P hysterectomy     May 2010     Past Surgical History:   Procedure Laterality Date    HX GYN      excision of bartholins gland    HX HYSTERECTOMY       Social History     Socioeconomic History    Marital status:      Spouse name: Not on file    Number of children: Not on file    Years of education: Not on file    Highest education level: Not on file   Tobacco Use    Smoking status: Never Smoker    Smokeless tobacco: Never Used   Substance and Sexual Activity    Alcohol use: Yes     Frequency: Monthly or less     Drinks per session: 1 or 2     Binge frequency: Never     Comment: wine for special occasions    Drug use: No     Family History   Problem Relation Age of Onset    Diabetes Maternal Grandmother     Coronary Artery Disease Maternal Grandmother     Diabetes Maternal Grandfather     Coronary Artery Disease Maternal Grandfather     Cancer Maternal Grandfather     Coronary Artery Disease Paternal Grandmother     Coronary Artery Disease Paternal Grandfather     Hypertension Father      Current Outpatient Medications   Medication Sig Dispense Refill    metFORMIN (GLUCOPHAGE) 500 mg tablet metformin 500 mg tablet      fluticasone propionate (FLOVENT HFA) 110 mcg/actuation inhaler Take 2 Puffs by inhalation every twelve (12) hours. 1 Inhaler 1    albuterol (PROVENTIL HFA, VENTOLIN HFA, PROAIR HFA) 90 mcg/actuation inhaler Take 1 Puff by inhalation every six (6) hours as needed for Wheezing.  1 Inhaler 3    atorvastatin (LIPITOR) 10 mg tablet TAKE 1 TABLET (10 MG) BY MOUTH DAILY IN THE EVENING FOR HIGH CHOLESTEROL      tolterodine (DETROL) 2 mg tablet TAKE 1 TABLET BY MOUTH EVERY DAY      loratadine (Claritin) 10 mg tablet Take 10 mg by mouth daily as needed. Allergies   Allergen Reactions    Penicillins Hives       Objective: There were no vitals taken for this visit. Physical Exam:   Physical Exam  Vitals signs and nursing note reviewed. Constitutional:       Appearance: Normal appearance. HENT:      Head: Normocephalic and atraumatic. Eyes:      General: No scleral icterus. Conjunctiva/sclera: Conjunctivae normal.      Pupils: Pupils are equal, round, and reactive to light. Neck:      Musculoskeletal: Normal range of motion. Skin:     Coloration: Skin is not jaundiced or pale. Findings: No rash. Neurological:      Mental Status: She is alert and oriented to person, place, and time. Psychiatric:         Mood and Affect: Mood normal.         Behavior: Behavior normal.         Thought Content: Thought content normal.         Judgment: Judgment normal.          Results for orders placed or performed in visit on 07/31/20   CBC WITH AUTOMATED DIFF   Result Value Ref Range    WBC 8.0 3.4 - 10.8 x10E3/uL    RBC 4.56 3.77 - 5.28 x10E6/uL    HGB 12.1 11.1 - 15.9 g/dL    HCT 38.5 34.0 - 46.6 %    MCV 84 79 - 97 fL    MCH 26.5 (L) 26.6 - 33.0 pg    MCHC 31.4 (L) 31.5 - 35.7 g/dL    RDW 17.2 (H) 11.7 - 15.4 %    PLATELET 424 747 - 636 x10E3/uL    NEUTROPHILS 65 Not Estab. %    Lymphocytes 26 Not Estab. %    MONOCYTES 7 Not Estab. %    EOSINOPHILS 2 Not Estab. %    BASOPHILS 0 Not Estab. %    ABS. NEUTROPHILS 5.1 1.4 - 7.0 x10E3/uL    Abs Lymphocytes 2.1 0.7 - 3.1 x10E3/uL    ABS. MONOCYTES 0.6 0.1 - 0.9 x10E3/uL    ABS. EOSINOPHILS 0.2 0.0 - 0.4 x10E3/uL    ABS. BASOPHILS 0.0 0.0 - 0.2 x10E3/uL    IMMATURE GRANULOCYTES 0 Not Estab. %    ABS. IMM.  GRANS. 0.0 0.0 - 0.1 U26V2/EF   METABOLIC PANEL, COMPREHENSIVE   Result Value Ref Range    Glucose 110 (H) 65 - 99 mg/dL    BUN 13 6 - 24 mg/dL    Creatinine 0.88 0.57 - 1.00 mg/dL    GFR est non-AA 80 >59 mL/min/1.73    GFR est AA 92 >59 mL/min/1.73 BUN/Creatinine ratio 15 9 - 23    Sodium 139 134 - 144 mmol/L    Potassium 4.4 3.5 - 5.2 mmol/L    Chloride 102 96 - 106 mmol/L    CO2 24 20 - 29 mmol/L    Calcium 8.9 8.7 - 10.2 mg/dL    Protein, total 7.2 6.0 - 8.5 g/dL    Albumin 4.0 3.8 - 4.8 g/dL    GLOBULIN, TOTAL 3.2 1.5 - 4.5 g/dL    A-G Ratio 1.3 1.2 - 2.2    Bilirubin, total 0.3 0.0 - 1.2 mg/dL    Alk. phosphatase 81 39 - 117 IU/L    AST (SGOT) 19 0 - 40 IU/L    ALT (SGPT) 28 0 - 32 IU/L   LIPID PANEL WITH LDL/HDL RATIO   Result Value Ref Range    Cholesterol, total 193 100 - 199 mg/dL    Triglyceride 80 0 - 149 mg/dL    HDL Cholesterol 63 >39 mg/dL    VLDL, calculated 16 5 - 40 mg/dL    LDL, calculated 114 (H) 0 - 99 mg/dL    LDL/HDL Ratio 1.8 0.0 - 3.2 ratio   HEMOGLOBIN A1C W/O EAG   Result Value Ref Range    Hemoglobin A1c 6.4 (H) 4.8 - 5.6 %       Assessment/Plan:    Not achieving weight loss despite of following diet per what she reports. We can explore possibility of bariatric surgery, morbid obesity is causing her multiple pain, fatigue and frustration   Says she still feels a palpable mass on her right lower back, will order us     Asthma is well controlled now, continue flovent       ICD-10-CM ICD-9-CM    1. Morbid obesity with BMI of 50.0-59.9, adult ([facility] [address]  E66.01 278.01 REFERRAL TO BARIATRIC SURGERY    Q64.67 C02.28 METABOLIC PANEL, COMPREHENSIVE      HEMOGLOBIN A1C WITH EAG      T4, FREE      TSH 3RD GENERATION      LIPID PANEL      TESTOSTERONE, TOTAL, FEMALE/CHILD   2. Palpable mass of lower back  R22.2 782.2 US ABD LTD   3. Moderate persistent asthma with acute exacerbation  J45.41 493.92      Orders Placed This Encounter    US ABD LTD     Standing Status:   Future     Standing Expiration Date:   3/12/2022     Scheduling Instructions:      [employer] [facility]       Address: [address]      Phone: [phone]     Order Specific Question:   Is Patient Pregnant?      Answer:   No     Order Specific Question: Specific Body Part     Answer:   right lower back    METABOLIC PANEL, COMPREHENSIVE    HEMOGLOBIN A1C WITH EAG    T4, FREE    TSH 3RD GENERATION    LIPID PANEL    TESTOSTERONE, TOTAL, FEMALE/CHILD    REFERRAL TO BARIATRIC SURGERY     Referral Priority:   Routine     Referral Type:   Consultation     Referral Reason:   Specialty Services Required     Referred to Provider:   Emir Segundo MD     Number of Visits Requested:   1     lose weight, increase physical activity, follow low fat diet, follow low salt diet, routine labs ordered, call if any problems  There are no Patient Instructions on file for this visit. Follow-up and Dispositions    · Return in about 2 months (around 4/12/2021).

## 2021-03-11 LAB
ALBUMIN SERPL-MCNC: 4.1 G/DL (ref 3.8–4.8)
ALBUMIN/GLOB SERPL: 1.3 {RATIO} (ref 1.2–2.2)
ALP SERPL-CCNC: 103 IU/L (ref 39–117)
ALT SERPL-CCNC: 38 IU/L (ref 0–32)
AST SERPL-CCNC: 24 IU/L (ref 0–40)
BILIRUB SERPL-MCNC: 0.2 MG/DL (ref 0–1.2)
BUN SERPL-MCNC: 10 MG/DL (ref 6–24)
BUN/CREAT SERPL: 12 (ref 9–23)
CALCIUM SERPL-MCNC: 8.7 MG/DL (ref 8.7–10.2)
CHLORIDE SERPL-SCNC: 102 MMOL/L (ref 96–106)
CHOLEST SERPL-MCNC: 194 MG/DL (ref 100–199)
CO2 SERPL-SCNC: 24 MMOL/L (ref 20–29)
CREAT SERPL-MCNC: 0.81 MG/DL (ref 0.57–1)
EST. AVERAGE GLUCOSE BLD GHB EST-MCNC: 140 MG/DL
GLOBULIN SER CALC-MCNC: 3.2 G/DL (ref 1.5–4.5)
GLUCOSE SERPL-MCNC: 91 MG/DL (ref 65–99)
HBA1C MFR BLD: 6.5 % (ref 4.8–5.6)
HDLC SERPL-MCNC: 61 MG/DL
LDLC SERPL CALC-MCNC: 116 MG/DL (ref 0–99)
POTASSIUM SERPL-SCNC: 4.5 MMOL/L (ref 3.5–5.2)
PROT SERPL-MCNC: 7.3 G/DL (ref 6–8.5)
SODIUM SERPL-SCNC: 139 MMOL/L (ref 134–144)
T4 FREE SERPL-MCNC: 1.15 NG/DL (ref 0.82–1.77)
TESTOST SERPL-MCNC: 28.5 NG/DL
TRIGL SERPL-MCNC: 97 MG/DL (ref 0–149)
TSH SERPL DL<=0.005 MIU/L-ACNC: 1.84 UIU/ML (ref 0.45–4.5)
VLDLC SERPL CALC-MCNC: 17 MG/DL (ref 5–40)

## 2021-03-12 RX ORDER — METFORMIN HYDROCHLORIDE 1000 MG/1
1000 TABLET ORAL 2 TIMES DAILY WITH MEALS
Qty: 180 TAB | Refills: 1 | Status: SHIPPED | OUTPATIENT
Start: 2021-03-12 | End: 2021-03-31 | Stop reason: SINTOL

## 2021-03-12 NOTE — PROGRESS NOTES
Zohreh Stefanie, all your labs are normal except your glucose is high and hemoglobin A1c 6.4%, both in prediabetes range, we already know, but my concern is at that hemoglobin A1c is very borderline, 6.5% is already considered diabetes, it is important to restart your metformin, I am sending prescription to your pharmacy.  You will start taking 500 mg/day.  And continue your weight loss efforts.  Please let me know if you have any questions.

## 2021-03-18 ENCOUNTER — TELEPHONE (OUTPATIENT)
Dept: INTERNAL MEDICINE CLINIC | Age: 46
End: 2021-03-18

## 2021-03-18 DIAGNOSIS — E11.9 TYPE 2 DIABETES MELLITUS WITHOUT COMPLICATION, WITHOUT LONG-TERM CURRENT USE OF INSULIN (HCC): Primary | ICD-10-CM

## 2021-03-22 RX ORDER — GLIPIZIDE 2.5 MG/1
2.5 TABLET, EXTENDED RELEASE ORAL
Qty: 30 TAB | Refills: 1 | Status: SHIPPED | OUTPATIENT
Start: 2021-03-22 | End: 2021-05-21

## 2021-03-31 ENCOUNTER — OFFICE VISIT (OUTPATIENT)
Dept: INTERNAL MEDICINE CLINIC | Age: 46
End: 2021-03-31
Payer: COMMERCIAL

## 2021-03-31 VITALS
HEIGHT: 68 IN | BODY MASS INDEX: 44.41 KG/M2 | TEMPERATURE: 97.6 F | RESPIRATION RATE: 18 BRPM | SYSTOLIC BLOOD PRESSURE: 140 MMHG | WEIGHT: 293 LBS | DIASTOLIC BLOOD PRESSURE: 87 MMHG | OXYGEN SATURATION: 100 % | HEART RATE: 67 BPM

## 2021-03-31 DIAGNOSIS — J30.2 SEASONAL ALLERGIC RHINITIS, UNSPECIFIED TRIGGER: ICD-10-CM

## 2021-03-31 DIAGNOSIS — E11.9 TYPE 2 DIABETES MELLITUS WITHOUT COMPLICATION, WITHOUT LONG-TERM CURRENT USE OF INSULIN (HCC): ICD-10-CM

## 2021-03-31 DIAGNOSIS — N39.41 URGE INCONTINENCE OF URINE: Primary | ICD-10-CM

## 2021-03-31 PROCEDURE — 99214 OFFICE O/P EST MOD 30 MIN: CPT | Performed by: INTERNAL MEDICINE

## 2021-03-31 RX ORDER — LANCETS
EACH MISCELLANEOUS
Qty: 100 EACH | Refills: 3 | Status: SHIPPED | OUTPATIENT
Start: 2021-03-31

## 2021-03-31 RX ORDER — IBUPROFEN 200 MG
CAPSULE ORAL
Qty: 100 STRIP | Refills: 5 | Status: SHIPPED | OUTPATIENT
Start: 2021-03-31

## 2021-03-31 RX ORDER — LORATADINE 10 MG/1
10 TABLET ORAL DAILY
Qty: 90 TAB | Refills: 1 | Status: SHIPPED | OUTPATIENT
Start: 2021-03-31 | End: 2021-09-27

## 2021-03-31 RX ORDER — ISOPROPYL ALCOHOL 70 ML/100ML
SWAB TOPICAL
Qty: 100 PAD | Refills: 3 | Status: SHIPPED | OUTPATIENT
Start: 2021-03-31

## 2021-03-31 RX ORDER — CONTAINER,EMPTY
EACH MISCELLANEOUS
Qty: 1 EACH | Refills: 5 | Status: SHIPPED | OUTPATIENT
Start: 2021-03-31

## 2021-03-31 RX ORDER — TOLTERODINE TARTRATE 2 MG/1
2 TABLET, EXTENDED RELEASE ORAL DAILY
Qty: 90 TAB | Refills: 1 | Status: SHIPPED | OUTPATIENT
Start: 2021-03-31 | End: 2021-09-20

## 2021-03-31 RX ORDER — INSULIN PUMP SYRINGE, 3 ML
EACH MISCELLANEOUS
Qty: 1 KIT | Refills: 0 | Status: SHIPPED | OUTPATIENT
Start: 2021-03-31

## 2021-03-31 NOTE — PROGRESS NOTES
Dianne Lowery is a 39 y.o. female and presents with Follow-up (medications)    Last virtual visit aftetr reviewng labs we discussed about restarting metformin due to increase f A1C to 6.5%, bbut after few days on it she had severe persistent diarrhea and she stopped it, I ordered her glipizide but she didn't take it becuase she wanted to know more about it. She says she's following diet and gradually improving, baking every thing, working in eating smaller portions and drinking more water. She's nott walking much yet    She's still feeling a knot in her abdominal wall that moves around and hurts sometimes, no increase in size       No depression or anxiety. She got 1st dose of covid vaccine on 3/26     Review of Systems  Review of Systems   Constitutional: Negative for chills, fatigue, fever and unexpected weight change. HENT: Negative for congestion, ear pain, sneezing and sore throat. Eyes: Negative for pain and discharge. Respiratory: Negative for cough, shortness of breath and wheezing. Cardiovascular: Negative for chest pain, palpitations and leg swelling. Gastrointestinal: Negative for abdominal pain, blood in stool, constipation and diarrhea. Endocrine: Negative for polydipsia and polyuria. Genitourinary: Negative for difficulty urinating, dysuria, frequency, hematuria and urgency. Musculoskeletal: Negative for arthralgias, back pain and joint swelling. Skin: Negative for rash. Allergic/Immunologic: Negative for environmental allergies and food allergies. Neurological: Negative for dizziness, speech difficulty, weakness, light-headedness, numbness and headaches. Hematological: Negative for adenopathy. Psychiatric/Behavioral: Negative for behavioral problems (Depression), sleep disturbance and suicidal ideas.           Past Medical History:   Diagnosis Date    Asthma     Hypertension     Obesity     S/P hysterectomy     May 2010     Past Surgical History:   Procedure Laterality Date    HX GYN      excision of bartholins gland    HX HYSTERECTOMY       Social History     Socioeconomic History    Marital status:      Spouse name: Not on file    Number of children: Not on file    Years of education: Not on file    Highest education level: Not on file   Tobacco Use    Smoking status: Never Smoker    Smokeless tobacco: Never Used   Substance and Sexual Activity    Alcohol use: Yes     Frequency: Monthly or less     Drinks per session: 1 or 2     Binge frequency: Never     Comment: wine for special occasions    Drug use: No     Family History   Problem Relation Age of Onset    Diabetes Maternal Grandmother     Coronary Artery Disease Maternal Grandmother     Diabetes Maternal Grandfather     Coronary Artery Disease Maternal Grandfather     Cancer Maternal Grandfather     Coronary Artery Disease Paternal Grandmother     Coronary Artery Disease Paternal Grandfather     Hypertension Father      Current Outpatient Medications   Medication Sig Dispense Refill    prenatal multivit-ca-min-fe-fa tab Take 1 Tab by mouth daily.  loratadine (Claritin) 10 mg tablet Take 1 Tab by mouth daily for 180 days. 90 Tab 1    tolterodine (DETROL) 2 mg tablet Take 1 Tab by mouth daily for 180 days. 90 Tab 1    semaglutide (OZEMPIC) 0.25 mg/0.2 mL (2 mg/1.5 mL) sub-q pen 0.25 mg by SubCUTAneous route every seven (7) days for 30 days.  1 Pen 0    alcohol swabs padm Use to check glucose once a day in the morning before breakfast  Indications: diabetes 100 Pad 3    Blood-Glucose Meter monitoring kit Use to check glucose once a day in the mornings before breakfast 1 Kit 0    glucose blood VI test strips (blood glucose test) strip Use to check glucose once a day in the morning before breakfast 100 Strip 5    lancets misc Use to check glucose once a day in the morning before breakfast 100 Each 3    Oral Medication Containers (Sharps Container) misc Use to dispose pen needle 1 Each 5    fluticasone propionate (FLOVENT HFA) 110 mcg/actuation inhaler Take 2 Puffs by inhalation every twelve (12) hours. 1 Inhaler 1    albuterol (PROVENTIL HFA, VENTOLIN HFA, PROAIR HFA) 90 mcg/actuation inhaler Take 1 Puff by inhalation every six (6) hours as needed for Wheezing. 1 Inhaler 3    atorvastatin (LIPITOR) 10 mg tablet TAKE 1 TABLET (10 MG) BY MOUTH DAILY IN THE EVENING FOR HIGH CHOLESTEROL      glipiZIDE SR (GLUCOTROL XL) 2.5 mg CR tablet Take 1 Tab by mouth Daily (before breakfast) for 60 days. 30 Tab 1     Allergies   Allergen Reactions    Penicillins Hives       Objective:  Visit Vitals  BP (!) 140/87 (BP 1 Location: Right upper arm, BP Patient Position: Sitting, BP Cuff Size: Adult)   Pulse 67   Temp 97.6 °F (36.4 °C) (Oral)   Resp 18   Ht 5' 8\" (1.727 m)   Wt (!) 372 lb 12.8 oz (169.1 kg)   SpO2 100% Comment: RA   BMI 56.68 kg/m²     Physical Exam:   Physical Exam  Constitutional:       General: She is not in acute distress. Appearance: Normal appearance. She is morbidly obese. HENT:      Head: Normocephalic and atraumatic. Mouth/Throat:      Mouth: Mucous membranes are moist.   Eyes:      Extraocular Movements: Extraocular movements intact. Conjunctiva/sclera: Conjunctivae normal.      Pupils: Pupils are equal, round, and reactive to light. Neck:      Musculoskeletal: Normal range of motion and neck supple. Cardiovascular:      Rate and Rhythm: Normal rate and regular rhythm. Pulses: Normal pulses. Heart sounds: Normal heart sounds. Pulmonary:      Effort: Pulmonary effort is normal.      Breath sounds: Normal breath sounds. Abdominal:      General: Abdomen is flat. Bowel sounds are normal. There is no distension. Palpations: Abdomen is soft. There is no mass. Tenderness: There is no abdominal tenderness. Musculoskeletal:         General: No swelling or deformity. Right lower leg: No edema. Left lower leg: No edema. Lymphadenopathy:      Cervical: No cervical adenopathy. Skin:     General: Skin is warm and dry. Capillary Refill: Capillary refill takes less than 2 seconds. Coloration: Skin is not jaundiced or pale. Findings: No erythema or rash. Neurological:      General: No focal deficit present. Mental Status: She is alert and oriented to person, place, and time. Psychiatric:         Mood and Affect: Mood normal.         Behavior: Behavior normal.         Thought Content: Thought content normal.         Judgment: Judgment normal.          Results for orders placed or performed in visit on 05/27/92   METABOLIC PANEL, COMPREHENSIVE   Result Value Ref Range    Glucose 91 65 - 99 mg/dL    BUN 10 6 - 24 mg/dL    Creatinine 0.81 0.57 - 1.00 mg/dL    GFR est non-AA 88 >59 mL/min/1.73    GFR est  >59 mL/min/1.73    BUN/Creatinine ratio 12 9 - 23    Sodium 139 134 - 144 mmol/L    Potassium 4.5 3.5 - 5.2 mmol/L    Chloride 102 96 - 106 mmol/L    CO2 24 20 - 29 mmol/L    Calcium 8.7 8.7 - 10.2 mg/dL    Protein, total 7.3 6.0 - 8.5 g/dL    Albumin 4.1 3.8 - 4.8 g/dL    GLOBULIN, TOTAL 3.2 1.5 - 4.5 g/dL    A-G Ratio 1.3 1.2 - 2.2    Bilirubin, total 0.2 0.0 - 1.2 mg/dL    Alk.  phosphatase 103 39 - 117 IU/L    AST (SGOT) 24 0 - 40 IU/L    ALT (SGPT) 38 (H) 0 - 32 IU/L   HEMOGLOBIN A1C WITH EAG   Result Value Ref Range    Hemoglobin A1c 6.5 (H) 4.8 - 5.6 %    Estimated average glucose 140 mg/dL   T4, FREE   Result Value Ref Range    T4, Free 1.15 0.82 - 1.77 ng/dL   TSH 3RD GENERATION   Result Value Ref Range    TSH 1.840 0.450 - 4.500 uIU/mL   LIPID PANEL   Result Value Ref Range    Cholesterol, total 194 100 - 199 mg/dL    Triglyceride 97 0 - 149 mg/dL    HDL Cholesterol 61 >39 mg/dL    VLDL, calculated 17 5 - 40 mg/dL    LDL, calculated 116 (H) 0 - 99 mg/dL   TESTOSTERONE, TOTAL, FEMALE/CHILD   Result Value Ref Range    Testosterone, Serum (Total) 28.5 ng/dL       Assessment/Plan:    Glucose control has gone from prediabetes range to diabetes with hemoglobin A1c of 6.5%, she is not tolerating Metformin and has not started the glipizide I send recently in replacement. I had a discussion with her of all the available options for treatment, and she prefers using GLP-1 analog, so we will start with Ozempic, asked her to come when she has the pen so I can teach her how to inject properly. Also she needs to start checking her glucose levels and keep them at goal, educated on goal of less than 120 fasting, ordering supplies. Encouraged her to continue efforts of weight loss, gave her printout of diabetic diet with instructions on portion control and carbohydrate counting. Increase physical activity. Needs refill of tolterodine for incontinence      ICD-10-CM ICD-9-CM    1. Urge incontinence of urine  N39.41 788. 31 tolterodine (DETROL) 2 mg tablet   2. Seasonal allergic rhinitis, unspecified trigger  J30.2 477.9 loratadine (Claritin) 10 mg tablet   3. Type 2 diabetes mellitus without complication, without long-term current use of insulin ([facility])  E11.9 250.00 semaglutide (OZEMPIC) 0.25 mg/0.2 mL (2 mg/1.5 mL) sub-q pen      alcohol swabs padm      Blood-Glucose Meter monitoring kit      glucose blood VI test strips (blood glucose test) strip      lancets misc      Oral Medication Containers (Sharps Container) [facility]      MICROALBUMIN, UR, RAND W/ MICROALB/CREAT RATIO     Orders Placed This Encounter    MICROALBUMIN, UR, RAND W/ MICROALB/CREAT RATIO    prenatal multivit-ca-min-fe-fa tab     Sig: Take 1 Tab by mouth daily.  loratadine (Claritin) 10 mg tablet     Sig: Take 1 Tab by mouth daily for 180 days. Dispense:  90 Tab     Refill:  1    tolterodine (DETROL) 2 mg tablet     Sig: Take 1 Tab by mouth daily for 180 days. Dispense:  90 Tab     Refill:  1    semaglutide (OZEMPIC) 0.25 mg/0.2 mL (2 mg/1.5 mL) sub-q pen     Si.25 mg by SubCUTAneous route every seven (7) days for 30 days.      Dispense:  1 Pen Refill:  0    alcohol swabs padm     Sig: Use to check glucose once a day in the morning before breakfast  Indications: diabetes     Dispense:  100 Pad     Refill:  3    Blood-Glucose Meter monitoring kit     Sig: Use to check glucose once a day in the mornings before breakfast     Dispense:  1 Kit     Refill:  0    glucose blood VI test strips (blood glucose test) strip     Sig: Use to check glucose once a day in the morning before breakfast     Dispense:  100 Strip     Refill:  5    lancets misc     Sig: Use to check glucose once a day in the morning before breakfast     Dispense:  100 Each     Refill:  3    Oral Medication Containers (Sharps Container) misc     Sig: Use to dispose pen needle     Dispense:  1 Each     Refill:  5     lose weight, increase physical activity, follow low fat diet, follow low salt diet, routine labs ordered, call if any problems, bring glucose logs to next visit. There are no Patient Instructions on file for this visit.

## 2021-03-31 NOTE — PROGRESS NOTES
1. Have you been to the ER, urgent care clinic since your last visit? Hospitalized since your last visit? No    2. Have you seen or consulted any other health care providers outside of the [address] [place] [address] since your last visit? Include any pap smears or colon screening. No     Chief Complaint   Patient presents with    Follow-up     medications     Pt states that she is here to f/u on her medications.

## 2021-04-19 LAB
ALBUMIN/CREAT UR: 9 MG/G CREAT (ref 0–29)
CREAT UR-MCNC: 138.2 MG/DL
MICROALBUMIN UR-MCNC: 12.3 UG/ML

## 2021-05-14 ENCOUNTER — VIRTUAL VISIT (OUTPATIENT)
Dept: INTERNAL MEDICINE CLINIC | Age: 46
End: 2021-05-14
Payer: COMMERCIAL

## 2021-05-14 DIAGNOSIS — N39.41 URGE INCONTINENCE OF URINE: ICD-10-CM

## 2021-05-14 DIAGNOSIS — E11.9 TYPE 2 DIABETES MELLITUS WITHOUT COMPLICATION, WITHOUT LONG-TERM CURRENT USE OF INSULIN (HCC): ICD-10-CM

## 2021-05-14 DIAGNOSIS — E66.01 OBESITY, MORBID (HCC): Primary | ICD-10-CM

## 2021-05-14 PROCEDURE — 99214 OFFICE O/P EST MOD 30 MIN: CPT | Performed by: INTERNAL MEDICINE

## 2021-05-14 RX ORDER — SEMAGLUTIDE 1.34 MG/ML
0.25 INJECTION, SOLUTION SUBCUTANEOUS
Qty: 1 BOX | Refills: 2 | Status: SHIPPED | OUTPATIENT
Start: 2021-05-14 | End: 2021-06-29 | Stop reason: DRUGHIGH

## 2021-05-14 RX ORDER — SEMAGLUTIDE 1.34 MG/ML
0.25 INJECTION, SOLUTION SUBCUTANEOUS
COMMUNITY
End: 2021-05-14 | Stop reason: SDUPTHER

## 2021-05-14 NOTE — PROGRESS NOTES
1. Have you been to the ER, urgent care clinic since your last visit? Hospitalized since your last visit? No    2. Have you seen or consulted any other health care providers outside of the [address] [place] [address] since your last visit? Include any pap smears or colon screening.  No      Chief Complaint   Patient presents with    Follow-up    Hypertension    Migraine    Incontinence     Pt states that she needs to f/u today    Send link to [phone]

## 2021-05-14 NOTE — PROGRESS NOTES
Larkin Nageotte (: 1975) is a 39 y.o. female, established patient, here for evaluation of the following chief complaint(s):   Follow-up, Hypertension, Migraine, and Incontinence       ASSESSMENT/PLAN:  Below is the assessment and plan developed based on review of pertinent labs, studies, and medications. From what she says her glucose seems well controlled on Ozempic and she's tolerating the medication with no problems, will continue 0.25 mg weekly. Encouraged her to continue modifying her diet and exercising, has first appointment with bariatric surgery in May 24th     Ad for the urine smell, she had been complaining of that before, UA has been normal, I encouraged her to see obgyn to r/o BV, also probably sweating associated to the obesity has a component to it. 1. Obesity, morbid ([address]  2. Type 2 diabetes mellitus without complication, without long-term current use of insulin (HCC)  -     semaglutide (Ozempic) 0.25 mg/0.2 mL (2 mg/1.5 mL) sub-q pen; 0.25 mg by SubCUTAneous route every seven (7) days. , Normal, Disp-1 Box, R-2  3. Urge incontinence of urine      Return in about 1 month (around 2021) for obesity, diabetes. SUBJECTIVE/OBJECTIVE:  MAIK Banegas says she's feeling good, she has been injecting the Ozempic with no problems. She say her glucose has been looking good, fatigue is better. She says yesterday evening it was 96, morning yesterday was 101. In the past month the highest 116. She says she's doing better and good with the diet, drinking only eater, eating only baked lean meats, and counting the carb portions. 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 She says she still is having a rotten egg smell when she urinates, no dysuria. No discharge. Last time she noticed the smell was a couple of days ago. She has her first appointment with Bariatric program on May 24th.      Review of Systems Constitutional: Negative for chills, fatigue, fever and unexpected weight change. HENT: Negative for congestion, ear pain, sneezing and sore throat. Eyes: Negative for pain and discharge. Respiratory: Negative for cough, shortness of breath and wheezing. Cardiovascular: Negative for chest pain, palpitations and leg swelling. Gastrointestinal: Negative for abdominal pain, blood in stool, constipation and diarrhea. Endocrine: Negative for polydipsia and polyuria. Genitourinary: Negative for difficulty urinating, dysuria, frequency, hematuria and urgency. Musculoskeletal: Negative for arthralgias, back pain and joint swelling. Skin: Negative for rash. Allergic/Immunologic: Negative for environmental allergies and food allergies. Neurological: Negative for dizziness, speech difficulty, weakness, light-headedness, numbness and headaches. Hematological: Negative for adenopathy. Psychiatric/Behavioral: Negative for behavioral problems (Depression), sleep disturbance and suicidal ideas. Patient-Reported Vitals 5/14/2021   Patient-Reported Weight 360lbs   Patient-Reported Height -   Patient-Reported LMP -       Physical Exam  Vitals signs and nursing note reviewed. Constitutional:       Appearance: Normal appearance. She is morbidly obese. HENT:      Head: Normocephalic and atraumatic. Eyes:      General: No scleral icterus. Conjunctiva/sclera: Conjunctivae normal.      Pupils: Pupils are equal, round, and reactive to light. Skin:     Coloration: Skin is not jaundiced or pale. Findings: No rash. Neurological:      Mental Status: She is alert and oriented to person, place, and time. Psychiatric:         Mood and Affect: Mood normal.         Behavior: Behavior normal.         Thought Content: Thought content normal.         Judgment: Judgment normal.     [    [facility], was evaluated through a synchronous (real-time) audio-video encounter.  The patient (or guardian if applicable) is aware that this is a billable service. Verbal consent to proceed has been obtained within the past 12 months. The visit was conducted pursuant to the emergency declaration under the [address] [place] [address] authority and the [employer] Act. Patient identification was verified, and a caregiver was present when appropriate. The patient was located in a state where the provider was credentialed to provide care. An electronic signature was used to authenticate this note.   -- Stephane Hutton MD

## 2021-06-29 ENCOUNTER — OFFICE VISIT (OUTPATIENT)
Dept: INTERNAL MEDICINE CLINIC | Age: 46
End: 2021-06-29
Payer: COMMERCIAL

## 2021-06-29 VITALS
BODY MASS INDEX: 44.41 KG/M2 | WEIGHT: 293 LBS | RESPIRATION RATE: 18 BRPM | SYSTOLIC BLOOD PRESSURE: 124 MMHG | TEMPERATURE: 98 F | DIASTOLIC BLOOD PRESSURE: 86 MMHG | OXYGEN SATURATION: 94 % | HEIGHT: 68 IN | HEART RATE: 81 BPM

## 2021-06-29 DIAGNOSIS — G89.29 CHRONIC RIGHT-SIDED LOW BACK PAIN WITHOUT SCIATICA: ICD-10-CM

## 2021-06-29 DIAGNOSIS — G43.011 INTRACTABLE MIGRAINE WITHOUT AURA AND WITH STATUS MIGRAINOSUS: Primary | ICD-10-CM

## 2021-06-29 DIAGNOSIS — M54.50 CHRONIC RIGHT-SIDED LOW BACK PAIN WITHOUT SCIATICA: ICD-10-CM

## 2021-06-29 DIAGNOSIS — E11.9 TYPE 2 DIABETES MELLITUS WITHOUT COMPLICATION, WITHOUT LONG-TERM CURRENT USE OF INSULIN (HCC): ICD-10-CM

## 2021-06-29 LAB — HBA1C MFR BLD HPLC: 6.6 %

## 2021-06-29 PROCEDURE — 83036 HEMOGLOBIN GLYCOSYLATED A1C: CPT | Performed by: INTERNAL MEDICINE

## 2021-06-29 PROCEDURE — 99214 OFFICE O/P EST MOD 30 MIN: CPT | Performed by: INTERNAL MEDICINE

## 2021-06-29 RX ORDER — ACETAMINOPHEN, ASPIRIN (NSAID), AND CAFFEINE 250; 250; 65 MG/1; MG/1; MG/1
1 TABLET, FILM COATED ORAL
Qty: 30 TABLET | Refills: 0 | Status: SHIPPED | OUTPATIENT
Start: 2021-06-29

## 2021-06-29 RX ORDER — SUMATRIPTAN 50 MG/1
TABLET, FILM COATED ORAL
Qty: 20 TABLET | Refills: 0 | Status: SHIPPED | OUTPATIENT
Start: 2021-06-29 | End: 2021-07-23

## 2021-06-29 RX ORDER — DICLOFENAC SODIUM 10 MG/G
2 GEL TOPICAL 4 TIMES DAILY
Qty: 100 G | Refills: 2 | Status: SHIPPED | OUTPATIENT
Start: 2021-06-29 | End: 2021-09-22 | Stop reason: SDUPTHER

## 2021-06-29 RX ORDER — LIDOCAINE 50 MG/G
1 PATCH TOPICAL EVERY 24 HOURS
Qty: 30 PATCH | Refills: 0 | Status: SHIPPED | OUTPATIENT
Start: 2021-06-29 | End: 2021-07-29

## 2021-06-29 RX ORDER — METOCLOPRAMIDE 5 MG/1
5 TABLET ORAL 3 TIMES DAILY
Qty: 6 TABLET | Refills: 0 | Status: SHIPPED | OUTPATIENT
Start: 2021-06-29 | End: 2021-07-01

## 2021-06-29 NOTE — PATIENT INSTRUCTIONS
Migraine Headache: Care Instructions  Overview     Migraines are painful, throbbing headaches that often start on one side of the head. They may cause nausea and vomiting and make you sensitive to light, sound, or smell. Without treatment, migraines can last from 4 hours to a few days. Medicines can help prevent migraines or stop them after they have started. Your doctor can help you find which ones work best for you. Follow-up care is a key part of your treatment and safety. Be sure to make and go to all appointments, and call your doctor if you are having problems. It's also a good idea to know your test results and keep a list of the medicines you take. How can you care for yourself at home? · Do not drive if you have taken a prescription pain medicine. · Rest in a quiet, dark room until your headache is gone. Close your eyes, and try to relax or go to sleep. Don't watch TV or read. · Put a cold, moist cloth or cold pack on the painful area for 10 to 20 minutes at a time. Put a thin cloth between the cold pack and your skin. · Use a warm, moist towel or a heating pad set on low to relax tight shoulder and neck muscles. · Have someone gently massage your neck and shoulders. · Take your medicines exactly as prescribed. Call your doctor if you think you are having a problem with your medicine. You will get more details on the specific medicines your doctor prescribes. · Don't take medicine for headache pain too often. Talk to your doctor if you are taking medicine more than 2 days a week to stop a headache. Taking too much pain medicine can lead to more headaches. These are called medicine-overuse headaches. To prevent migraines  · Keep a headache diary so you can figure out what triggers your headaches. Avoiding triggers may help you prevent headaches. Record when each headache began, how long it lasted, and what the pain was like.  Write down any other symptoms you had with the headache, such as nausea, flashing lights or dark spots, or sensitivity to bright light or loud noise. Note if the headache occurred near your period. List anything that might have triggered the headache. Triggers may include certain foods (chocolate, cheese, wine) or odors, smoke, bright light, stress, or lack of sleep. · If your doctor has prescribed medicine for your migraines, take it as directed. You may have medicine that you take only when you get a migraine and medicine that you take all the time to help prevent migraines. ? If your doctor has prescribed medicine for when you get a headache, take it at the first sign of a migraine, unless your doctor has given you other instructions. ? If your doctor has prescribed medicine to prevent migraines, take it exactly as prescribed. Call your doctor if you think you are having a problem with your medicine. · Find healthy ways to deal with stress. Migraines are most common during or right after stressful times. Try finding ways to reduce stress like practicing mindfulness or deep breathing exercises. · Get plenty of sleep and exercise. But be careful to not push yourself too hard during exercise. It may trigger a headache. · Eat meals on a regular schedule. Avoid foods and drinks that often trigger migraines. These include chocolate, alcohol (especially red wine and port), aspartame, monosodium glutamate (MSG), and some additives found in foods (such as hot dogs, dixon, cold cuts, aged cheeses, and pickled foods). · Limit caffeine. Don't drink too much coffee, tea, or soda. But don't quit caffeine suddenly. That can also give you migraines. · Do not smoke or allow others to smoke around you. If you need help quitting, talk to your doctor about stop-smoking programs and medicines. These can increase your chances of quitting for good. · If you are taking birth control pills or hormone therapy, talk to your doctor about whether they are triggering your migraines.   When should you call for help? Call 911 anytime you think you may need emergency care. For example, call if:    · You have signs of a stroke. These may include:  ? Sudden numbness, paralysis, or weakness in your face, arm, or leg, especially on only one side of your body. ? Sudden vision changes. ? Sudden trouble speaking. ? Sudden confusion or trouble understanding simple statements. ? Sudden problems with walking or balance. ? A sudden, severe headache that is different from past headaches. Call your doctor now or seek immediate medical care if:    · You have new or worse nausea and vomiting.     · You have a new or higher fever.     · Your headache gets much worse. Watch closely for changes in your health, and be sure to contact your doctor if:    · You are not getting better after 2 days (48 hours). Where can you learn more? Go to http://www.gray.com/  Enter B162 in the search box to learn more about \"Migraine Headache: Care Instructions. \"  Current as of: August 4, 2020               Content Version: 12.8  © 2006-2021 [employer]. Care instructions adapted under license by [employer] (which disclaims liability or warranty for this information). If you have questions about a medical condition or this instruction, always ask your healthcare professional. [address] any warranty or liability for your use of this information.

## 2021-06-29 NOTE — PROGRESS NOTES
1. Have you been to the ER, urgent care clinic since your last visit? Hospitalized since your last visit? No    2. Have you seen or consulted any other health care providers outside of the [address] [place] [address] since your last visit? Include any pap smears or colon screening. No     Chief Complaint   Patient presents with    Migraine    Back Pain     Pt states that she has been having a migraine since Friday. She states that she is having back pain. She said that she has been taking advil dual action. She states that the medication is not working.

## 2021-06-29 NOTE — PROGRESS NOTES
Clementina Bermudez is a 39 y.o. female and presents with Migraine and Back Pain    Katelyn has been habving throbbing headache bifrontal for 5 days, her pain is 8/10, shee has tried putting her room dark and cold, she says she found a sumatriptan she had from long ago, she took it, she has tried Advil dual action. She says last night her vision was blurry, denies diplopia, no nausea or vomiting. Last time she took advil was Saturday, she took and aleve yesterday. She has been going to bed and waking up with headache. She has BRITTANY, uses her cpap around 6 hous per night, she says she does not sleep to well despite of this. DM: She kisha been injecting the ozempic with no issues, following diet, she has lost 9 lbs since last visit, last time she checked her glucose was 5 days ago was 180 \"something\". She says in the past couple of wweeks glucose has gone up to 197 the highest she recalls. She continues havingg pain in the right side of her lower back and right flank, tendenress, inttermittent, sometimes the pain is so bad it's difficult to stand up and walk. If she's driving the pain comes all of a sudden. She says sometimes it's so bad it brings her to her knees . She says it feels like a constant contraction pain when it comes. She has denise havingg this problem for years but recently getting worse. Review of Systems  Review of Systems   Constitutional: Negative for chills, fatigue, fever and unexpected weight change. HENT: Negative for congestion, ear pain, sneezing and sore throat. Eyes: Negative for pain and discharge. Respiratory: Negative for cough, shortness of breath and wheezing. Cardiovascular: Negative for chest pain, palpitations and leg swelling. Gastrointestinal: Negative for abdominal pain, blood in stool, constipation and diarrhea. Endocrine: Negative for polydipsia and polyuria. Genitourinary: Negative for difficulty urinating, dysuria, frequency, hematuria and urgency. Musculoskeletal: Negative for arthralgias, back pain and joint swelling. Skin: Negative for rash. Allergic/Immunologic: Negative for environmental allergies and food allergies. Neurological: Negative for dizziness, speech difficulty, weakness, light-headedness, numbness and headaches. Hematological: Negative for adenopathy. Psychiatric/Behavioral: Negative for behavioral problems (Depression), sleep disturbance and suicidal ideas. Past Medical History:   Diagnosis Date    Asthma     Hypertension     Obesity     S/P hysterectomy     May 2010     Past Surgical History:   Procedure Laterality Date    HX GYN      excision of bartholins gland    HX HYSTERECTOMY       Social History     Socioeconomic History    Marital status:      Spouse name: Not on file    Number of children: Not on file    Years of education: Not on file    Highest education level: Not on file   Tobacco Use    Smoking status: Never Smoker    Smokeless tobacco: Never Used   Vaping Use    Vaping Use: Never used   Substance and Sexual Activity    Alcohol use: Yes     Comment: wine for special occasions    Drug use: No     Social Determinants of Health     Financial Resource Strain:     Difficulty of Paying Living Expenses:    Food Insecurity:     Worried About Running Out of Food in the Last Year:     Ran Out of Food in the Last Year:    Transportation Needs:     Lack of Transportation (Medical):      Lack of Transportation (Non-Medical):    Physical Activity:     Days of Exercise per Week:     Minutes of Exercise per Session:    Stress:     Feeling of Stress :    Social Connections:     Frequency of Communication with Friends and Family:     Frequency of Social Gatherings with Friends and Family:     Attends [employer] Services:     Active Member of Clubs or Organizations:     Attends Club or Organization Meetings:     Marital Status:      Family History   Problem Relation Age of Onset    Diabetes Maternal Grandmother     Coronary Artery Disease Maternal Grandmother     Diabetes Maternal Grandfather     Coronary Artery Disease Maternal Grandfather     Cancer Maternal Grandfather     Coronary Artery Disease Paternal Grandmother     Coronary Artery Disease Paternal Grandfather     Hypertension Father      Current Outpatient Medications   Medication Sig Dispense Refill    SUMAtriptan (IMITREX) 50 mg tablet Take 1 tablet as needed for migraine, can repeat dose after 2 hours, no more than 2 tablets a day 20 Tablet 0    aspirin-acetaminophen-caffeine (Excedrin Extra Strength) 250-250-65 mg per tablet Take 1 Tablet by mouth every eight (8) hours as needed for Headache. 30 Tablet 0    metoclopramide HCl (REGLAN) 5 mg tablet Take 1 Tablet by mouth three (3) times daily for 2 days. 6 Tablet 0    semaglutide (OZEMPIC) 0.25 mg/0.2 mL (2 mg/1.5 mL) sub-q pen 0.5 mg by SubCUTAneous route every seven (7) days for 90 days. 3 Pen 0    diclofenac (VOLTAREN) 1 % gel Apply 2 g to affected area four (4) times daily. 100 g 2    lidocaine (LIDODERM) 5 % 1 Patch by TransDERmal route every twenty-four (24) hours for 30 days. Apply patch to the affected area for 12 hours a day and remove for 12 hours a day. 30 Patch 0    prenatal multivit-ca-min-fe-fa tab Take 1 Tab by mouth daily.  loratadine (Claritin) 10 mg tablet Take 1 Tab by mouth daily for 180 days. 90 Tab 1    tolterodine (DETROL) 2 mg tablet Take 1 Tab by mouth daily for 180 days.  90 Tab 1    alcohol swabs padm Use to check glucose once a day in the morning before breakfast  Indications: diabetes 100 Pad 3    Blood-Glucose Meter monitoring kit Use to check glucose once a day in the mornings before breakfast 1 Kit 0    glucose blood VI test strips (blood glucose test) strip Use to check glucose once a day in the morning before breakfast 100 Strip 5    lancets misc Use to check glucose once a day in the morning before breakfast 100 Each 3    Oral Medication Containers (Sharps Container) misc Use to dispose pen needle 1 Each 5    fluticasone propionate (FLOVENT HFA) 110 mcg/actuation inhaler Take 2 Puffs by inhalation every twelve (12) hours. (Patient not taking: Reported on 6/29/2021) 1 Inhaler 1    albuterol (PROVENTIL HFA, VENTOLIN HFA, PROAIR HFA) 90 mcg/actuation inhaler Take 1 Puff by inhalation every six (6) hours as needed for Wheezing. (Patient not taking: Reported on 6/29/2021) 1 Inhaler 3    atorvastatin (LIPITOR) 10 mg tablet TAKE 1 TABLET (10 MG) BY MOUTH DAILY IN THE EVENING FOR HIGH CHOLESTEROL (Patient not taking: Reported on 6/29/2021)       Allergies   Allergen Reactions    Penicillins Hives       Objective:  Visit Vitals  /86 (BP 1 Location: Right lower arm, BP Patient Position: Sitting, BP Cuff Size: Adult)   Pulse 81   Temp 98 °F (36.7 °C) (Oral)   Resp 18   Ht 5' 8\" (1.727 m)   Wt (!) 363 lb 4.8 oz (164.8 kg)   SpO2 94% Comment: RA   BMI 55.24 kg/m²     Physical Exam:   Physical Exam  Constitutional:       General: She is not in acute distress. Appearance: Normal appearance. She is morbidly obese. HENT:      Head: Normocephalic and atraumatic. Mouth/Throat:      Mouth: Mucous membranes are moist.   Eyes:      Extraocular Movements: Extraocular movements intact. Right eye: Normal extraocular motion and no nystagmus. Left eye: Normal extraocular motion and no nystagmus. Conjunctiva/sclera: Conjunctivae normal.      Pupils: Pupils are equal, round, and reactive to light. Funduscopic exam:     Right eye: No papilledema. Left eye: No papilledema. Cardiovascular:      Rate and Rhythm: Normal rate and regular rhythm. Pulses: Normal pulses. Heart sounds: Normal heart sounds. Pulmonary:      Effort: Pulmonary effort is normal.      Breath sounds: Normal breath sounds. Abdominal:      General: Abdomen is flat. Bowel sounds are normal. There is no distension.       Palpations: Abdomen is soft. There is no mass. Tenderness: There is no abdominal tenderness. Musculoskeletal:         General: No swelling or deformity. Cervical back: Normal range of motion and neck supple. Lumbar back: Tenderness (right side towards the flank ) present. Right lower leg: No edema. Left lower leg: No edema. Lymphadenopathy:      Cervical: No cervical adenopathy. Skin:     General: Skin is warm and dry. Capillary Refill: Capillary refill takes less than 2 seconds. Coloration: Skin is not jaundiced or pale. Findings: No erythema or rash. Neurological:      General: No focal deficit present. Mental Status: She is alert and oriented to person, place, and time. Cranial Nerves: No cranial nerve deficit. Sensory: Sensation is intact. Motor: Motor function is intact. Coordination: Coordination is intact. Gait: Gait is intact. Psychiatric:         Mood and Affect: Mood normal.         Behavior: Behavior normal.         Thought Content: Thought content normal.         Judgment: Judgment normal.          Results for orders placed or performed in visit on 06/29/21   AMB POC HEMOGLOBIN A1C   Result Value Ref Range    Hemoglobin A1c (POC) 6.6 %       Assessment/Plan:    Treat acute episode of migraine as below, gave her instructions on how to take the medications. She has not had migraines since a year ago. No papilledema appreciated in the physical exam, neurological exam within normal limits. Instructed her to limit now if migraine fails to improve after 2 or 3 more days, or if it becomes recurrent meaning having more than 4 episodes in a month. Gave her recommendations for migraine prevention and sleep hygiene    For diabetes hemoglobin A1c 6.6%, but she says in the past 2 weeks glucose has been up to the 180s highest 197. Increase Ozempic from 0.25 to 0.5 mg, encouraged her to bring glucose diary to the next appointment.   She is already in bariatric program with Dr. Mariana Lemus and has access to nutrition counseling    For chronic right-sided low back pain, she is tender so unlikely this is an intra-abdominal origin, from the findings in the physical exam is more musculoskeletal.  Symptomatic treatment as below, needs to do physical therapy since the pain has been present for more than a year. ICD-10-CM ICD-9-CM    1. Intractable migraine without aura and with status migrainosus  G43.011 346.13 SUMAtriptan (IMITREX) 50 mg tablet      aspirin-acetaminophen-caffeine (Excedrin Extra Strength) 250-250-65 mg per tablet      metoclopramide HCl (REGLAN) 5 mg tablet   2. Type 2 diabetes mellitus without complication, without long-term current use of insulin (HCC)  E11.9 250.00 AMB POC HEMOGLOBIN A1C      semaglutide (OZEMPIC) 0.25 mg/0.2 mL (2 mg/1.5 mL) sub-q pen   3. Chronic right-sided low back pain without sciatica  M54.5 724.2 REFERRAL TO PHYSICAL THERAPY    G89.29 338.29 diclofenac (VOLTAREN) 1 % gel      lidocaine (LIDODERM) 5 %     Orders Placed This Encounter    EMPL [facility]     Referral Priority:   Routine     Referral Type:   PT/OT/ST     Referral Reason:   Specialty Services Required     Number of Visits Requested:   1    AMB POC HEMOGLOBIN A1C    SUMAtriptan (IMITREX) 50 mg tablet     Sig: Take 1 tablet as needed for migraine, can repeat dose after 2 hours, no more than 2 tablets a day     Dispense:  20 Tablet     Refill:  0    aspirin-acetaminophen-caffeine (Excedrin Extra Strength) 250-250-65 mg per tablet     Sig: Take 1 Tablet by mouth every eight (8) hours as needed for Headache. Dispense:  30 Tablet     Refill:  0    metoclopramide HCl (REGLAN) 5 mg tablet     Sig: Take 1 Tablet by mouth three (3) times daily for 2 days. Dispense:  6 Tablet     Refill:  0    semaglutide (OZEMPIC) 0.25 mg/0.2 mL (2 mg/1.5 mL) sub-q pen     Si.5 mg by SubCUTAneous route every seven (7) days for 90 days.      Dispense: 3 Pen     Refill:  0    diclofenac (VOLTAREN) 1 % gel     Sig: Apply 2 g to affected area four (4) times daily. Dispense:  100 g     Refill:  2    lidocaine (LIDODERM) 5 %     Si Patch by TransDERmal route every twenty-four (24) hours for 30 days. Apply patch to the affected area for 12 hours a day and remove for 12 hours a day. Dispense:  30 Patch     Refill:  0     lose weight, increase physical activity, call if any problems, bring glucose diary to next visit   Patient Instructions          Migraine Headache: Care Instructions  Overview     Migraines are painful, throbbing headaches that often start on one side of the head. They may cause nausea and vomiting and make you sensitive to light, sound, or smell. Without treatment, migraines can last from 4 hours to a few days. Medicines can help prevent migraines or stop them after they have started. Your doctor can help you find which ones work best for you. Follow-up care is a key part of your treatment and safety. Be sure to make and go to all appointments, and call your doctor if you are having problems. It's also a good idea to know your test results and keep a list of the medicines you take. How can you care for yourself at home? · Do not drive if you have taken a prescription pain medicine. · Rest in a quiet, dark room until your headache is gone. Close your eyes, and try to relax or go to sleep. Don't watch TV or read. · Put a cold, moist cloth or cold pack on the painful area for 10 to 20 minutes at a time. Put a thin cloth between the cold pack and your skin. · Use a warm, moist towel or a heating pad set on low to relax tight shoulder and neck muscles. · Have someone gently massage your neck and shoulders. · Take your medicines exactly as prescribed. Call your doctor if you think you are having a problem with your medicine. You will get more details on the specific medicines your doctor prescribes.   · Don't take medicine for headache pain too often. Talk to your doctor if you are taking medicine more than 2 days a week to stop a headache. Taking too much pain medicine can lead to more headaches. These are called medicine-overuse headaches. To prevent migraines  · Keep a headache diary so you can figure out what triggers your headaches. Avoiding triggers may help you prevent headaches. Record when each headache began, how long it lasted, and what the pain was like. Write down any other symptoms you had with the headache, such as nausea, flashing lights or dark spots, or sensitivity to bright light or loud noise. Note if the headache occurred near your period. List anything that might have triggered the headache. Triggers may include certain foods (chocolate, cheese, wine) or odors, smoke, bright light, stress, or lack of sleep. · If your doctor has prescribed medicine for your migraines, take it as directed. You may have medicine that you take only when you get a migraine and medicine that you take all the time to help prevent migraines. ? If your doctor has prescribed medicine for when you get a headache, take it at the first sign of a migraine, unless your doctor has given you other instructions. ? If your doctor has prescribed medicine to prevent migraines, take it exactly as prescribed. Call your doctor if you think you are having a problem with your medicine. · Find healthy ways to deal with stress. Migraines are most common during or right after stressful times. Try finding ways to reduce stress like practicing mindfulness or deep breathing exercises. · Get plenty of sleep and exercise. But be careful to not push yourself too hard during exercise. It may trigger a headache. · Eat meals on a regular schedule. Avoid foods and drinks that often trigger migraines.  These include chocolate, alcohol (especially red wine and port), aspartame, monosodium glutamate (MSG), and some additives found in foods (such as hot dogs, dixon, cold cuts, aged cheeses, and pickled foods). · Limit caffeine. Don't drink too much coffee, tea, or soda. But don't quit caffeine suddenly. That can also give you migraines. · Do not smoke or allow others to smoke around you. If you need help quitting, talk to your doctor about stop-smoking programs and medicines. These can increase your chances of quitting for good. · If you are taking birth control pills or hormone therapy, talk to your doctor about whether they are triggering your migraines. When should you call for help? Call 911 anytime you think you may need emergency care. For example, call if:    · You have signs of a stroke. These may include:  ? Sudden numbness, paralysis, or weakness in your face, arm, or leg, especially on only one side of your body. ? Sudden vision changes. ? Sudden trouble speaking. ? Sudden confusion or trouble understanding simple statements. ? Sudden problems with walking or balance. ? A sudden, severe headache that is different from past headaches. Call your doctor now or seek immediate medical care if:    · You have new or worse nausea and vomiting.     · You have a new or higher fever.     · Your headache gets much worse. Watch closely for changes in your health, and be sure to contact your doctor if:    · You are not getting better after 2 days (48 hours). Where can you learn more? Go to http://www.gray.com/  Enter R895 in the search box to learn more about \"Migraine Headache: Care Instructions. \"  Current as of: August 4, 2020               Content Version: 12.8  © 2006-2021 [employer]. Care instructions adapted under license by [employer] (which disclaims liability or warranty for this information). If you have questions about a medical condition or this instruction, always ask your healthcare professional. [place] [address] any warranty or liability for your use of this information.          Follow-up and Dispositions    · Return in about 2 months (around 8/29/2021).

## 2021-06-29 NOTE — LETTER
NOTIFICATION RETURN TO WORK / SCHOOL    6/29/2021 2:03 PM    Ms. Hunter [phone]      To Whom It May Concern:    Maranda Ferrer is currently under the care of [address] [place] [address]. She will return to work/school on: 07/01/2021    If there are questions or concerns please have the patient contact our office.         Sincerely,          Sheyla Frausto MD

## 2021-07-07 ENCOUNTER — OFFICE VISIT (OUTPATIENT)
Dept: OBGYN CLINIC | Age: 46
End: 2021-07-07
Payer: COMMERCIAL

## 2021-07-07 VITALS
WEIGHT: 293 LBS | DIASTOLIC BLOOD PRESSURE: 80 MMHG | SYSTOLIC BLOOD PRESSURE: 132 MMHG | BODY MASS INDEX: 44.41 KG/M2 | HEIGHT: 68 IN

## 2021-07-07 DIAGNOSIS — Z90.710 VAGINAL PAP SMEAR FOLLOWING HYSTERECTOMY FOR MALIGNANCY: Primary | ICD-10-CM

## 2021-07-07 DIAGNOSIS — Z11.3 SCREENING FOR VENEREAL DISEASE: ICD-10-CM

## 2021-07-07 DIAGNOSIS — Z11.51 SCREENING FOR HUMAN PAPILLOMAVIRUS: ICD-10-CM

## 2021-07-07 DIAGNOSIS — Z08 VAGINAL PAP SMEAR FOLLOWING HYSTERECTOMY FOR MALIGNANCY: Primary | ICD-10-CM

## 2021-07-07 PROCEDURE — 99396 PREV VISIT EST AGE 40-64: CPT | Performed by: OBSTETRICS & GYNECOLOGY

## 2021-07-07 NOTE — PROGRESS NOTES
Linda Thorpe is a 39 y.o. female who presents today for the following:  Chief Complaint   Patient presents with    Annual Exam     Pt presents for annual exam with complaints of recurring boils around vagina //MKeeley     Other        Allergies   Allergen Reactions    Penicillins Hives       Current Outpatient Medications   Medication Sig    SUMAtriptan (IMITREX) 50 mg tablet Take 1 tablet as needed for migraine, can repeat dose after 2 hours, no more than 2 tablets a day    aspirin-acetaminophen-caffeine (Excedrin Extra Strength) 250-250-65 mg per tablet Take 1 Tablet by mouth every eight (8) hours as needed for Headache.  semaglutide (OZEMPIC) 0.25 mg/0.2 mL (2 mg/1.5 mL) sub-q pen 0.5 mg by SubCUTAneous route every seven (7) days for 90 days.  diclofenac (VOLTAREN) 1 % gel Apply 2 g to affected area four (4) times daily.  lidocaine (LIDODERM) 5 % 1 Patch by TransDERmal route every twenty-four (24) hours for 30 days. Apply patch to the affected area for 12 hours a day and remove for 12 hours a day.  prenatal multivit-ca-min-fe-fa tab Take 1 Tab by mouth daily.  loratadine (Claritin) 10 mg tablet Take 1 Tab by mouth daily for 180 days.  tolterodine (DETROL) 2 mg tablet Take 1 Tab by mouth daily for 180 days.  alcohol swabs padm Use to check glucose once a day in the morning before breakfast  Indications: diabetes    Blood-Glucose Meter monitoring kit Use to check glucose once a day in the mornings before breakfast    glucose blood VI test strips (blood glucose test) strip Use to check glucose once a day in the morning before breakfast    lancets misc Use to check glucose once a day in the morning before breakfast    Oral Medication Containers (Sharps Container) misc Use to dispose pen needle    fluticasone propionate (FLOVENT HFA) 110 mcg/actuation inhaler Take 2 Puffs by inhalation every twelve (12) hours.  (Patient not taking: Reported on 6/29/2021)    albuterol (PROVENTIL HFA, VENTOLIN HFA, PROAIR HFA) 90 mcg/actuation inhaler Take 1 Puff by inhalation every six (6) hours as needed for Wheezing. (Patient not taking: Reported on 6/29/2021)    atorvastatin (LIPITOR) 10 mg tablet TAKE 1 TABLET (10 MG) BY MOUTH DAILY IN THE EVENING FOR HIGH CHOLESTEROL (Patient not taking: Reported on 6/29/2021)     No current facility-administered medications for this visit. Past Medical History:   Diagnosis Date    Asthma     Hypertension     Obesity     S/P hysterectomy     May 2010       Past Surgical History:   Procedure Laterality Date    HX GYN      excision of bartholins gland    HX HYSTERECTOMY         Family History   Problem Relation Age of Onset    Diabetes Maternal Grandmother     Coronary Artery Disease Maternal Grandmother     Diabetes Maternal Grandfather     Coronary Artery Disease Maternal Grandfather     Cancer Maternal Grandfather     Coronary Artery Disease Paternal Grandmother     Coronary Artery Disease Paternal Grandfather     Hypertension Father        Social History     Socioeconomic History    Marital status:      Spouse name: Not on file    Number of children: Not on file    Years of education: Not on file    Highest education level: Not on file   Occupational History    Not on file   Tobacco Use    Smoking status: Never Smoker    Smokeless tobacco: Never Used   Vaping Use    Vaping Use: Never used   Substance and Sexual Activity    Alcohol use: Yes     Comment: wine for special occasions    Drug use: No    Sexual activity: Yes   Other Topics Concern    Not on file   Social History Narrative    Not on file     Social Determinants of Health     Financial Resource Strain:     Difficulty of Paying Living Expenses:    Food Insecurity:     Worried About Running Out of Food in the Last Year:     Ran Out of Food in the Last Year:    Transportation Needs:     Lack of Transportation (Medical):      Lack of Transportation (Non-Medical): Physical Activity:     Days of Exercise per Week:     Minutes of Exercise per Session:    Stress:     Feeling of Stress :    Social Connections:     Frequency of Communication with Friends and Family:     Frequency of Social Gatherings with Friends and Family:     Attends Worship Services:     Active Member of Clubs or Organizations:     Attends Club or Organization Meetings:     Marital Status:    Intimate Partner Violence:     Fear of Current or Ex-Partner:     Emotionally Abused:     Physically Abused:     Sexually Abused:          ROS   Review of Systems   Constitutional: Negative. HENT: Negative. Eyes: Negative. Respiratory: Negative. Cardiovascular: Negative. Gastrointestinal: Negative. Genitourinary: Negative. Musculoskeletal: Negative. Skin: Negative. Neurological: Negative. Endo/Heme/Allergies: Negative. Psychiatric/Behavioral: Negative. /80   Ht 5' 8\" (1.727 m)   Wt (!) 364 lb 8 oz (165.3 kg)   BMI 55.42 kg/m²    OBGyn Exam   Constitutional  · Appearance: well-nourished, well developed, alert, in no acute distress    HENT  · Head and Face: appears normal    Neck  · Inspection/Palpation: normal appearance, no masses or tenderness  · Lymph Nodes: no lymphadenopathy present  · Thyroid: gland size normal, nontender, no nodules or masses present on palpation    Breasts   Symmetric, no palpable masses, no tenderness, no skin changes, no nipple abnormality, no nipple discharge, no lymphadenopathy.     Chest  · Respiratory Effort: breathing labored  · Auscultation: normal breath sounds    Cardiovascular  · Heart:  · Auscultation: regular rate and rhythm without murmur    Gastrointestinal  · Abdominal Examination: abdomen non-tender to palpation, normal bowel sounds, no masses present  · Liver and spleen: no hepatomegaly present, spleen not palpable  · Hernias: no hernias identified    Genitourinary  · External Genitalia: normal appearance for age, no discharge present, no tenderness present, no inflammatory lesions present, no masses present, no atrophy present  · Vagina: normal vaginal vault without central or paravaginal defects, no discharge present, no inflammatory lesions present, no masses present  · Bladder: non-tender to palpation  · Urethra: appears normal  · Cervix: normal   · Uterus: normal size, shape and consistency  · Adnexa: no adnexal tenderness present, no adnexal masses present  · Perineum: perineum within normal limits, no evidence of trauma, no rashes or skin lesions present  · Anus: anus within normal limits, no hemorrhoids present  · Inguinal Lymph Nodes: no lymphadenopathy present    Skin  · General Inspection: no rash, no lesions identified    Neurologic/Psychiatric  · Mental Status:  · Orientation: grossly oriented to person, place and time  · Mood and Affect: mood normal, affect appropriate    No results found for this visit on 07/07/21. Orders Placed This Encounter    PAP IG, CT-NG-TV, RFX APTIMA HPV ASCUS ([id number])     Order Specific Question:   Pap Source? Answer:   Vaginal     Order Specific Question:   Total Hysterectomy? Answer:   Yes     Order Specific Question:   Supracervical Hysterectomy? Answer:   Yes     Order Specific Question:   Post Menopausal?     Answer:   Yes     Order Specific Question:   Hormone Therapy? Answer:   No     Order Specific Question:   IUD? Answer:   No     Order Specific Question:   Abnormal Bleeding? Answer:   No     Order Specific Question:   Pregnant     Answer:   No     Order Specific Question:   Post Partum? Answer:   No     40 yo  ANNUAL  MORBID OBESITY    1. Vaginal Pap smear following hysterectomy for malignancy    - PAP IG, CT-NG-TV, RFX APTIMA HPV ASCUS ([id number])    2. Screening for human papillomavirus    - PAP IG, CT-NG-TV, RFX APTIMA HPV ASCUS ([id number])    3.  Screening for venereal disease    - PAP IG, CT-NG-TV, RFX APTIMA HPV ASCUS ([id number])

## 2021-07-09 LAB
C TRACH RRNA CVX QL NAA+PROBE: NEGATIVE
CYTOLOGIST CVX/VAG CYTO: NORMAL
CYTOLOGY CVX/VAG DOC CYTO: NORMAL
CYTOLOGY CVX/VAG DOC THIN PREP: NORMAL
DX ICD CODE: NORMAL
LABCORP, 190119: NORMAL
Lab: NORMAL
N GONORRHOEA RRNA CVX QL NAA+PROBE: NEGATIVE
OTHER STN SPEC: NORMAL
STAT OF ADQ CVX/VAG CYTO-IMP: NORMAL
T VAGINALIS RRNA SPEC QL NAA+PROBE: NEGATIVE

## 2021-07-20 DIAGNOSIS — G43.011 INTRACTABLE MIGRAINE WITHOUT AURA AND WITH STATUS MIGRAINOSUS: ICD-10-CM

## 2021-07-23 RX ORDER — SUMATRIPTAN 50 MG/1
TABLET, FILM COATED ORAL
Qty: 18 TABLET | Refills: 1 | Status: SHIPPED | OUTPATIENT
Start: 2021-07-23

## 2021-08-03 PROBLEM — J45.909 ASTHMA: Status: RESOLVED | Noted: 2021-08-03 | Resolved: 2021-08-03

## 2021-09-07 ENCOUNTER — HOSPITAL ENCOUNTER (OUTPATIENT)
Dept: MAMMOGRAPHY | Age: 46
Discharge: HOME | End: 2021-09-07
Attending: OBSTETRICS & GYNECOLOGY
Payer: COMMERCIAL

## 2021-09-07 DIAGNOSIS — R92.8 ABNORMAL MAMMOGRAM: ICD-10-CM

## 2021-09-07 PROCEDURE — 77061 BREAST TOMOSYNTHESIS UNI: CPT

## 2021-09-07 PROCEDURE — 76642 ULTRASOUND BREAST LIMITED: CPT

## 2021-09-10 ENCOUNTER — TELEPHONE (OUTPATIENT)
Dept: INTERNAL MEDICINE CLINIC | Age: 46
End: 2021-09-10

## 2021-09-10 NOTE — TELEPHONE ENCOUNTER
Pt called and stated that the [facility] paperwork question #14 needs duration of flare-ups they were unable to read that they need you to sign and date any corrections.

## 2021-09-22 ENCOUNTER — OFFICE VISIT (OUTPATIENT)
Dept: INTERNAL MEDICINE CLINIC | Age: 46
End: 2021-09-22
Payer: COMMERCIAL

## 2021-09-22 VITALS
BODY MASS INDEX: 44.41 KG/M2 | SYSTOLIC BLOOD PRESSURE: 146 MMHG | WEIGHT: 293 LBS | OXYGEN SATURATION: 95 % | TEMPERATURE: 97.6 F | DIASTOLIC BLOOD PRESSURE: 94 MMHG | RESPIRATION RATE: 18 BRPM | HEIGHT: 68 IN | HEART RATE: 70 BPM

## 2021-09-22 DIAGNOSIS — M79.89 SOFT TISSUE MASS: ICD-10-CM

## 2021-09-22 DIAGNOSIS — E11.9 TYPE 2 DIABETES MELLITUS WITHOUT COMPLICATION, WITHOUT LONG-TERM CURRENT USE OF INSULIN (HCC): ICD-10-CM

## 2021-09-22 DIAGNOSIS — R10.9 RIGHT FLANK PAIN: Primary | ICD-10-CM

## 2021-09-22 DIAGNOSIS — M54.50 CHRONIC RIGHT-SIDED LOW BACK PAIN WITHOUT SCIATICA: ICD-10-CM

## 2021-09-22 DIAGNOSIS — E66.01 MORBID OBESITY WITH BMI OF 50.0-59.9, ADULT (HCC): ICD-10-CM

## 2021-09-22 DIAGNOSIS — G89.29 CHRONIC RIGHT-SIDED LOW BACK PAIN WITHOUT SCIATICA: ICD-10-CM

## 2021-09-22 LAB — HBA1C MFR BLD HPLC: 6.1 %

## 2021-09-22 PROCEDURE — 83036 HEMOGLOBIN GLYCOSYLATED A1C: CPT | Performed by: INTERNAL MEDICINE

## 2021-09-22 PROCEDURE — 99214 OFFICE O/P EST MOD 30 MIN: CPT | Performed by: INTERNAL MEDICINE

## 2021-09-22 RX ORDER — DICLOFENAC SODIUM 10 MG/G
2 GEL TOPICAL 4 TIMES DAILY
Qty: 100 G | Refills: 2 | Status: SHIPPED | OUTPATIENT
Start: 2021-09-22 | End: 2022-09-29 | Stop reason: ALTCHOICE

## 2021-09-22 NOTE — PROGRESS NOTES
1. Have you been to the ER, urgent care clinic since your last visit? Hospitalized since your last visit? No    2. Have you seen or consulted any other health care providers outside of the [address] [place] [address] since your last visit? Include any pap smears or colon screening. No     Chief Complaint   Patient presents with    Follow-up    Hypertension    Morbid Obesity    Migraine    Back Pain     Pt states that she needs to f/u today.

## 2021-09-22 NOTE — PROGRESS NOTES
Dianne Lowery is a 39 y.o. female and presents with Follow-up, Hypertension, Morbid Obesity, Migraine, and Back Pain    Katelyn has DM, she's on ozempic, she has lost 8 lbs since last visit in June, her last A1C was 6.6%. She did eye exam in June at [facility] vision care,she had her pupil dilated and retina checked     She says she's back at school, she feels she's feeling more anxious recently, she often feels she can not breath, she gets easily overwhelmed, she starts crying out of the blue, feels like a rush is coming, she can not think well, she has a hard time at \"getting it together\", last week at work she had a client talking too much and she felt too anxious and had to walk away. She says she;s not able to sleep well, she wakes up around 3 am     Migraines are not very often, she has neede to take the sumatriptan 3 times since June. Continues having pain in her right side, right flank, intermittent tenderness, she says she feels a know in the right side of her lower back     Review of Systems  Review of Systems   Constitutional: Negative for chills, fatigue, fever and unexpected weight change. HENT: Negative for congestion, ear pain, sneezing and sore throat. Eyes: Negative for pain and discharge. Respiratory: Negative for cough, shortness of breath and wheezing. Cardiovascular: Negative for chest pain, palpitations and leg swelling. Gastrointestinal: Negative for abdominal pain, blood in stool, constipation and diarrhea. Endocrine: Negative for polydipsia and polyuria. Genitourinary: Negative for difficulty urinating, dysuria, frequency, hematuria and urgency. Musculoskeletal: Negative for arthralgias, back pain and joint swelling. Skin: Negative for rash. Allergic/Immunologic: Negative for environmental allergies and food allergies. Neurological: Negative for dizziness, speech difficulty, weakness, light-headedness, numbness and headaches.    Hematological: Negative for adenopathy. Psychiatric/Behavioral: Negative for behavioral problems (Depression), sleep disturbance and suicidal ideas. Past Medical History:   Diagnosis Date    Asthma     Hypertension     Obesity     S/P hysterectomy     May 2010     Past Surgical History:   Procedure Laterality Date    HX GYN      excision of bartholins gland    HX HYSTERECTOMY       Social History     Socioeconomic History    Marital status: SINGLE     Spouse name: Not on file    Number of children: Not on file    Years of education: Not on file    Highest education level: Not on file   Tobacco Use    Smoking status: Never Smoker    Smokeless tobacco: Never Used   Vaping Use    Vaping Use: Never used   Substance and Sexual Activity    Alcohol use: Yes     Comment: wine for special occasions    Drug use: No    Sexual activity: Yes     Social Determinants of Health     Financial Resource Strain:     Difficulty of Paying Living Expenses:    Food Insecurity:     Worried About Running Out of Food in the Last Year:     Ran Out of Food in the Last Year:    Transportation Needs:     Lack of Transportation (Medical):      Lack of Transportation (Non-Medical):    Physical Activity:     Days of Exercise per Week:     Minutes of Exercise per Session:    Stress:     Feeling of Stress :    Social Connections:     Frequency of Communication with Friends and Family:     Frequency of Social Gatherings with Friends and Family:     Attends Alevism Services:     Active Member of Clubs or Organizations:     Attends Club or Organization Meetings:     Marital Status:      Family History   Problem Relation Age of Onset    Diabetes Maternal Grandmother     Coronary Artery Disease Maternal Grandmother     Diabetes Maternal Grandfather     Coronary Artery Disease Maternal Grandfather     Cancer Maternal Grandfather     Coronary Artery Disease Paternal Grandmother     Coronary Artery Disease Paternal David Peers Hypertension Father     Colon Cancer Maternal Uncle      Current Outpatient Medications   Medication Sig Dispense Refill    semaglutide (OZEMPIC) 0.25 mg or 0.5 mg/dose (2 mg/1.5 ml) subq pen 0.5 mg by SubCUTAneous route every seven (7) days for 336 days. 3 Pen 3    diclofenac (VOLTAREN) 1 % gel Apply 2 g to affected area four (4) times daily. 100 g 2    tolterodine (DETROL) 2 mg tablet TAKE 1 TAB BY MOUTH DAILY 90 Tablet 1    SUMAtriptan (IMITREX) 50 mg tablet TAKE 1 TABLET AS NEEDED FOR MIGRAINE, CAN REPEAT DOSE AFTER 2 HOURS, NO MORE THAN 2 TABLETS A DAY 18 Tablet 1    aspirin-acetaminophen-caffeine (Excedrin Extra Strength) 250-250-65 mg per tablet Take 1 Tablet by mouth every eight (8) hours as needed for Headache. 30 Tablet 0    prenatal multivit-ca-min-fe-fa tab Take 1 Tab by mouth daily.  alcohol swabs padm Use to check glucose once a day in the morning before breakfast  Indications: diabetes 100 Pad 3    Blood-Glucose Meter monitoring kit Use to check glucose once a day in the mornings before breakfast 1 Kit 0    glucose blood VI test strips (blood glucose test) strip Use to check glucose once a day in the morning before breakfast 100 Strip 5    lancets misc Use to check glucose once a day in the morning before breakfast 100 Each 3    Oral Medication Containers (Sharps Container) misc Use to dispose pen needle 1 Each 5    fluticasone propionate (FLOVENT HFA) 110 mcg/actuation inhaler Take 2 Puffs by inhalation every twelve (12) hours. 1 Inhaler 1    albuterol (PROVENTIL HFA, VENTOLIN HFA, PROAIR HFA) 90 mcg/actuation inhaler Take 1 Puff by inhalation every six (6) hours as needed for Wheezing.  1 Inhaler 3     Allergies   Allergen Reactions    Penicillins Hives       Objective:  Visit Vitals  BP (!) 146/94 (BP 1 Location: Right arm, BP Patient Position: Sitting, BP Cuff Size: Adult long)   Pulse 70   Temp 97.6 °F (36.4 °C) (Oral)   Resp 18   Ht 5' 8\" (1.727 m)   Wt (!) 357 lb 3.2 oz (162 kg)   SpO2 95% Comment: RA   BMI 54.31 kg/m²     Physical Exam:   Physical Exam  Constitutional:       General: She is not in acute distress. Appearance: Normal appearance. She is morbidly obese. HENT:      Head: Normocephalic and atraumatic. Mouth/Throat:      Mouth: Mucous membranes are moist.   Eyes:      Extraocular Movements: Extraocular movements intact. Conjunctiva/sclera: Conjunctivae normal.      Pupils: Pupils are equal, round, and reactive to light. Cardiovascular:      Rate and Rhythm: Normal rate and regular rhythm. Pulses: Normal pulses. Heart sounds: Normal heart sounds. Pulmonary:      Effort: Pulmonary effort is normal.      Breath sounds: Normal breath sounds. Abdominal:      General: Abdomen is flat. Bowel sounds are normal. There is no distension. Palpations: Abdomen is soft. There is no mass. Tenderness: There is no abdominal tenderness. Musculoskeletal:         General: No swelling or deformity. Cervical back: Normal range of motion and neck supple. Right lower leg: No edema. Left lower leg: No edema. Comments: tender mass 3 cms in the right side of her thoracic back at the level of T11-T12, closest to the right flank region   Lymphadenopathy:      Cervical: No cervical adenopathy. Skin:     General: Skin is warm and dry. Capillary Refill: Capillary refill takes less than 2 seconds. Coloration: Skin is not jaundiced or pale. Findings: No erythema or rash. Neurological:      General: No focal deficit present. Mental Status: She is alert and oriented to person, place, and time. Psychiatric:         Mood and Affect: Mood normal.         Behavior: Behavior normal.         Thought Content:  Thought content normal.         Judgment: Judgment normal.          Results for orders placed or performed in visit on 09/22/21   AMB POC HEMOGLOBIN A1C   Result Value Ref Range    Hemoglobin A1c (POC) 6.1 % Assessment/Plan:    Diabetes is well controlled continue current treatment without changes, mass palpated on the back described in physical exam, no soft tissue ultrasound. Counseled about lifestyle modification, diet and exercise. ICD-10-CM ICD-9-CM    1. Right flank pain  R10.9 789.09 US ABD LTD   2. Soft tissue mass  M79.89 729.99 US ABD LTD   3. Type 2 diabetes mellitus without complication, without long-term current use of insulin (HCC)  E11.9 250.00 semaglutide (OZEMPIC) 0.25 mg or 0.5 mg/dose (2 mg/1.5 ml) subq pen      AMB POC HEMOGLOBIN A1C   4. Chronic right-sided low back pain without sciatica  M54.5 724.2 diclofenac (VOLTAREN) 1 % gel    G89.29 338.29      Orders Placed This Encounter    US ABD LTD     Standing Status:   Future     Standing Expiration Date:   10/22/2022     Order Specific Question:   Is Patient Pregnant? Answer:   No     Order Specific Question:   Specific Body Part     Answer:   right thoracic back/right flank     Order Specific Question:   Reason for Exam     Answer:   tender mass 3 cms in the right side of her thoracic back at the level of T11-T12, closest to the right flank region    AMB POC HEMOGLOBIN A1C    semaglutide (OZEMPIC) 0.25 mg or 0.5 mg/dose (2 mg/1.5 ml) subq pen     Si.5 mg by SubCUTAneous route every seven (7) days for 336 days. Dispense:  3 Pen     Refill:  3    diclofenac (VOLTAREN) 1 % gel     Sig: Apply 2 g to affected area four (4) times daily. Dispense:  100 g     Refill:  2     lose weight, increase physical activity, follow low fat diet, follow low salt diet, call if any problems, glucose diary to next visit  There are no Patient Instructions on file for this visit. Follow-up and Dispositions    · Return in about 2 months (around 2021).

## 2021-10-01 PROBLEM — E11.9 TYPE 2 DIABETES MELLITUS WITHOUT COMPLICATION, WITHOUT LONG-TERM CURRENT USE OF INSULIN (HCC): Status: ACTIVE | Noted: 2021-10-01

## 2021-10-04 ENCOUNTER — HOSPITAL ENCOUNTER (OUTPATIENT)
Dept: ULTRASOUND IMAGING | Age: 46
Discharge: HOME | End: 2021-10-04
Attending: INTERNAL MEDICINE
Payer: COMMERCIAL

## 2021-10-04 DIAGNOSIS — R10.9 RIGHT FLANK PAIN: ICD-10-CM

## 2021-10-04 DIAGNOSIS — M79.89 SOFT TISSUE MASS: ICD-10-CM

## 2021-10-04 PROCEDURE — 76705 ECHO EXAM OF ABDOMEN: CPT

## 2021-12-02 ENCOUNTER — OFFICE VISIT (OUTPATIENT)
Dept: INTERNAL MEDICINE CLINIC | Age: 46
End: 2021-12-02
Payer: COMMERCIAL

## 2021-12-02 ENCOUNTER — TELEPHONE (OUTPATIENT)
Dept: INTERNAL MEDICINE CLINIC | Age: 46
End: 2021-12-02

## 2021-12-02 VITALS
DIASTOLIC BLOOD PRESSURE: 98 MMHG | HEART RATE: 62 BPM | BODY MASS INDEX: 44.41 KG/M2 | WEIGHT: 293 LBS | RESPIRATION RATE: 18 BRPM | OXYGEN SATURATION: 100 % | HEIGHT: 68 IN | TEMPERATURE: 97.4 F | SYSTOLIC BLOOD PRESSURE: 144 MMHG

## 2021-12-02 DIAGNOSIS — L20.82 FLEXURAL ECZEMA: ICD-10-CM

## 2021-12-02 DIAGNOSIS — Z11.59 NEED FOR HEPATITIS C SCREENING TEST: ICD-10-CM

## 2021-12-02 DIAGNOSIS — I10 ESSENTIAL HYPERTENSION: Primary | ICD-10-CM

## 2021-12-02 DIAGNOSIS — E11.9 TYPE 2 DIABETES MELLITUS WITHOUT COMPLICATION, WITHOUT LONG-TERM CURRENT USE OF INSULIN (HCC): ICD-10-CM

## 2021-12-02 PROCEDURE — 99214 OFFICE O/P EST MOD 30 MIN: CPT | Performed by: INTERNAL MEDICINE

## 2021-12-02 RX ORDER — LISINOPRIL 5 MG/1
5 TABLET ORAL DAILY
Qty: 90 TABLET | Refills: 1 | Status: SHIPPED | OUTPATIENT
Start: 2021-12-02 | End: 2022-06-12

## 2021-12-02 RX ORDER — TRIAMCINOLONE ACETONIDE 1 MG/G
OINTMENT TOPICAL 2 TIMES DAILY
Qty: 80 G | Refills: 3 | Status: SHIPPED | OUTPATIENT
Start: 2021-12-02 | End: 2022-09-29 | Stop reason: ALTCHOICE

## 2021-12-02 NOTE — PROGRESS NOTES
1. Have you been to the ER, urgent care clinic since your last visit? Hospitalized since your last visit? Yes When: 1 week ago Where: [facility] Reason for visit: Right knee pain    2. Have you seen or consulted any other health care providers outside of the [address] [place] [address] since your last visit? Include any pap smears or colon screening.  No     Chief Complaint   Patient presents with    Dry Skin     Pt states that she is having an eczema flare around her neck and between her breast

## 2021-12-02 NOTE — TELEPHONE ENCOUNTER
Called [facility] verified insurance   Effective date 1/21/2021  Call Ref # [id number]

## 2021-12-02 NOTE — PATIENT INSTRUCTIONS
Learning About the Low FODMAP Diet for Irritable Bowel Syndrome (IBS)  What is the low-FODMAP diet? A low-FODMAP diet is a way to find out what foods give you digestion problems. You stop eating certain high-FODMAP foods for about 2 months. Then you add them back to see how your body reacts. This is called a \"challenge diet. \" A dietitian or doctor can help you follow this diet. FODMAPs are carbohydrates. They are in many types of foods. FODMAP stands for:  · F ermentable. · O ligosaccharides. · D isaccharides. · M onosaccharides. · A nd p olyols. If you have digestive problems, some of these foods can make your symptoms worse. When you are on this diet, you can still eat certain fruits and vegetables. You can also eat certain grains, meats, fish, and lactose-free milks. What is it used for? If you have irritable bowel syndrome (IBS), you can ease your symptoms by not eating some types of foods. Some people also use this diet for inflammatory bowel disease (IBD) or some food intolerances. High-FODMAP foods can be hard to digest. They pull more fluid into your intestines. They are also easily fermented. This can lead to bloating, belly pain, gas, and diarrhea. The low-FODMAP diet can help you figure out what foods to avoid. And it can help you find foods that are easier to digest.  This diet can help with symptoms of some digestive diseases. But it's not a cure. You will still need to manage your condition. How does it work? You will work with a doctor or dietitian when you start the diet. At first, you won't eat any high-FODMAP foods for a few weeks. Go to [email or web address] to learn more about this diet. Nikko Ao also find links to an alice for your phone or other device. You'll find low-FODMAP cookbooks there too. After 6 to 8 weeks, you will start to try high-FODMAP foods again. You will add those foods back to your diet, one group at a time.  Your doctor or dietitian will probably have you wait a few days before you add each new group of those foods. Keep a food diary. You can write down the foods you try and note how they make you feel. After a few weeks, you may have a better idea of what foods you should avoid and what foods make you feel your best.  What are the risks? There is some risk of not getting all of the vitamins and nutrients you need on the low-FODMAP diet. These include:  · Folate. · Thiamin. · Vitamin B6.  · Calcium. · Vitamin D. Your dietitian or doctor can help you find other sources of these if needed. This diet may limit your fiber intake. Try to plan your meals to include other sources of fiber. What foods are on the low-FODMAP diet? Here is a guide to foods that you can eat, plus the foods that you should avoid, when you are on the low-FODMAP diet. Grains  Okay to eat: Foods made from grains like arrowroot, buckwheat, corn, millet, and oats. You can also eat potato, quinoa, rice, sorghum, tapioca, and teff. Cereals, pasta, breads, corn tortillas and baked goods made from these grains are also okay. (These grains may be labeled \"gluten-free. \")  Avoid: Grains like wheat, barley, and rye. Avoid ingredients such as bulgur, couscous, durum, and semolina. And avoid cereals, breads, and pastas made from these grains. Avoid chickpea, lentil, and pea flour. Proteins  Okay to eat: Most meat, fish, and eggs without high-FODMAP sauces. You can have small amounts of almonds or hazelnuts (10 nuts). Macadamia nuts, peanuts, pecans, pine nuts, and walnuts are also okay. You can also eat sarah and pumpkin seeds, tofu, and tempeh. Avoid: Beans, chickpeas, lentils, and soybeans. Avoid pistachio and cashew nuts. And some sausages may have high-FODMAP ingredients. Dairy  Okay to eat: Lactose-free dairy milks. Rice milk and almond milk are okay. So are lactose-free yogurts, kefirs, ice creams, and sorbet from low-FODMAP fruits and sweeteners. (These are often labeled \"lactose-free. \") You can have small amounts (2 Tbsp) of cottage, cream, or ricotta cheese. Hard cheeses like cheddar, [place], ROSS, and Swiss are okay. So are small amounts (1 oz) of aged or ripened cheeses like Brie, blue, and feta. Avoid: Milk, including cow, goat, and sheep. Avoid condensed or evaporated milk, buttermilk, custard, cream, sour cream, yogurt, and ice cream. Avoid soy milk. (Check sauces for dairy ingredients.)  Vegetables  Okay to eat: Bamboo shoots, bell peppers, bok yessenia, up to ½ cup of broccoli or cabbage (red or white), and cucumbers. Eggplant, green beans, lettuce, olives, parsnips, and potatoes are okay to eat. So are pumpkin, rutabaga, seaweed, sprouts, Swiss chard, and spinach. You can eat scallions (green part only) and squash (not butternut). You can eat tomatoes, turnips, watercress, yams, and zucchini. You can also have small amounts of artichoke hearts (from can, 1 oz), carrots, corn (½ cob), and sweet potato (½ cup). Avoid: Artichokes, asparagus, [place] sprouts, mayakn cabbage, cauliflower, and celery. And avoid garlic, leeks, mushrooms, okra, onions, scallions (white part), shallots, and peas. Fruits  Okay to eat: Bananas, blueberries, cantaloupe, coconut, grapes, and honeydew. Kiwi, dylon, limes, oranges, passion fruit, papaya, and pineapple are also okay. You can eat plantain, raspberries, rhubarb, star fruit, strawberries, tangelo, and tangerine. You can also have small amounts of dried banana chips (up to 10 chips), dried cranberries (1 Tbsp), and shredded coconut (up to ¼ cup). Avoid: Apples, applesauce, apricots, avocados, blackberries, boysenberries, and cherries. Also avoid dates, figs, grapefruit, guava, lychee, and mangoes. Don't eat nectarines, peaches, pears, persimmon, plums, prunes, tamarillo, or watermelon. And limit most canned and dried fruits.   Oils, spices, condiments, and sweeteners  Okay to eat: Vegetable oils (including garlic infused), butter, ghee, lard, and margarine (no trans fat). You can have most fresh herbs like basil, chives, coriander, christie, parsley, rosemary and thyme. You can have salt, jams made from low-FODMAP fruits, mayonnaise, and mustard. Soy sauce, hot sauce (no garlic), tamari, and vinegar are also okay. Sweeteners that are okay include sugar (sucrose), powdered (confectioner's) sugar, brown sugar, glucose, and maple syrup. You can also have some artificial sweeteners like aspartame, saccharine, and stevia. Avoid: Chutneys, hummus, jellies, garlic sauces, and gravies made with onion or garlic. Avoid pickles, relish, some salad dressings and soup stocks, salsa, and tomato paste. And avoid sauces and other foods with high fructose corn syrup, honey, molasses, and agave. Avoid artificial sweeteners (isomalt, mannitol, malitol, sorbitol, and xylitol). Avoid corn syrup solids, fructose, fruit juice concentrate, and polydextrose. Other foods and drinks  Okay to have: Water, soda water, tonic, soft drinks sweetened with sugar, ½ cup of low-FODMAP fruit juice, and most teas and alcohols. You can also eat foods made with baking powder and soda, cocoa, and gelatin. Avoid: Juices from high-FODMAP fruits and vegetables. And avoid fortified vidhi, chamomile and fennel teas, chicory-based drinks and coffee substitutes, and bouillon cubes. Follow-up care is a key part of your treatment and safety. Be sure to make and go to all appointments, and call your doctor if you are having problems. It's also a good idea to know your test results and keep a list of the medicines you take. Where can you learn more? Go to http://www.gray.com/  Enter L235 in the search box to learn more about \"Learning About the Low FODMAP Diet for Irritable Bowel Syndrome (IBS). \"  Current as of: December 17, 2020               Content Version: 13.0  © 0791-9424 [employer].    Care instructions adapted under license by [employer] (which disclaims liability or warranty for this information). If you have questions about a medical condition or this instruction, always ask your healthcare professional. [place] [address] any warranty or liability for your use of this information.

## 2021-12-02 NOTE — PROGRESS NOTES
Benjie Dwyer is a 55 y.o. female and presents with Dry Skin, Diabetes, and Hypertension    She has been having scally rash very itchy around her neck, and in between her breast, she has been using aveeno sopa, eucerin, this is not improving     She has been using her ozempic. Has lost 7 lbs since last visit, she has changed her eating habits and she feels less hungry. She says her glucose has been less than 115 at all times. BP is elevated again, she has not been checking her bp at home    Sedentary for the most part since the weather has been colder    Review of Systems  Review of Systems   Constitutional: Negative for chills, fatigue, fever and unexpected weight change. HENT: Negative for congestion, ear pain, sneezing and sore throat. Eyes: Negative for pain and discharge. Respiratory: Negative for cough, shortness of breath and wheezing. Cardiovascular: Negative for chest pain, palpitations and leg swelling. Gastrointestinal: Negative for abdominal pain, blood in stool, constipation and diarrhea. Endocrine: Negative for polydipsia and polyuria. Genitourinary: Negative for difficulty urinating, dysuria, frequency, hematuria and urgency. Musculoskeletal: Negative for arthralgias, back pain and joint swelling. Skin: Positive for rash. Allergic/Immunologic: Negative for environmental allergies and food allergies. Neurological: Negative for dizziness, speech difficulty, weakness, light-headedness, numbness and headaches. Hematological: Negative for adenopathy. Psychiatric/Behavioral: Negative for behavioral problems (Depression), sleep disturbance and suicidal ideas.           Past Medical History:   Diagnosis Date    Asthma     Hypertension     Obesity     S/P hysterectomy     May 2010     Past Surgical History:   Procedure Laterality Date    HX GYN      excision of bartholins gland    HX HYSTERECTOMY       Social History     Socioeconomic History    Marital status: SINGLE Tobacco Use    Smoking status: Never Smoker    Smokeless tobacco: Never Used   Vaping Use    Vaping Use: Never used   Substance and Sexual Activity    Alcohol use: Yes     Comment: wine for special occasions    Drug use: No    Sexual activity: Yes     Family History   Problem Relation Age of Onset    Diabetes Maternal Grandmother     Coronary Art Dis Maternal Grandmother     Diabetes Maternal Grandfather     Coronary Art Dis Maternal Grandfather     Cancer Maternal Grandfather     Coronary Art Dis Paternal Grandmother     Coronary Art Dis Paternal Grandfather     Hypertension Father     Colon Cancer Maternal Uncle      Current Outpatient Medications   Medication Sig Dispense Refill    triamcinolone acetonide (KENALOG) 0.1 % ointment Apply  to affected area two (2) times a day. use thin layer 80 g 3    lisinopriL (PRINIVIL, ZESTRIL) 5 mg tablet Take 1 Tablet by mouth daily for 180 days. 90 Tablet 1    semaglutide (OZEMPIC) 0.25 mg or 0.5 mg/dose (2 mg/1.5 ml) subq pen 0.5 mg by SubCUTAneous route every seven (7) days for 336 days. 3 Pen 3    diclofenac (VOLTAREN) 1 % gel Apply 2 g to affected area four (4) times daily. 100 g 2    tolterodine (DETROL) 2 mg tablet TAKE 1 TAB BY MOUTH DAILY 90 Tablet 1    SUMAtriptan (IMITREX) 50 mg tablet TAKE 1 TABLET AS NEEDED FOR MIGRAINE, CAN REPEAT DOSE AFTER 2 HOURS, NO MORE THAN 2 TABLETS A DAY 18 Tablet 1    aspirin-acetaminophen-caffeine (Excedrin Extra Strength) 250-250-65 mg per tablet Take 1 Tablet by mouth every eight (8) hours as needed for Headache. 30 Tablet 0    prenatal multivit-ca-min-fe-fa tab Take 1 Tab by mouth daily.       alcohol swabs padm Use to check glucose once a day in the morning before breakfast  Indications: diabetes 100 Pad 3    Blood-Glucose Meter monitoring kit Use to check glucose once a day in the mornings before breakfast 1 Kit 0    glucose blood VI test strips (blood glucose test) strip Use to check glucose once a day in the morning before breakfast 100 Strip 5    lancets misc Use to check glucose once a day in the morning before breakfast 100 Each 3    Oral Medication Containers (Sharps Container) misc Use to dispose pen needle 1 Each 5    fluticasone propionate (FLOVENT HFA) 110 mcg/actuation inhaler Take 2 Puffs by inhalation every twelve (12) hours. 1 Inhaler 1    albuterol (PROVENTIL HFA, VENTOLIN HFA, PROAIR HFA) 90 mcg/actuation inhaler Take 1 Puff by inhalation every six (6) hours as needed for Wheezing. 1 Inhaler 3     Allergies   Allergen Reactions    Penicillins Hives       Objective:  Visit Vitals  BP (!) 144/98 (BP 1 Location: Right arm, BP Patient Position: Sitting, BP Cuff Size: Adult long)   Pulse 62   Temp 97.4 °F (36.3 °C) (Oral)   Resp 18   Ht 5' 8\" (1.727 m)   Wt (!) 350 lb 9.6 oz (159 kg)   SpO2 100% Comment: RA   BMI 53.31 kg/m²     Physical Exam:   Physical Exam  Constitutional:       General: She is not in acute distress. Appearance: Normal appearance. She is morbidly obese. HENT:      Head: Normocephalic and atraumatic. Mouth/Throat:      Mouth: Mucous membranes are moist.   Eyes:      Extraocular Movements: Extraocular movements intact. Conjunctiva/sclera: Conjunctivae normal.      Pupils: Pupils are equal, round, and reactive to light. Cardiovascular:      Rate and Rhythm: Normal rate and regular rhythm. Pulses: Normal pulses. Heart sounds: Normal heart sounds. Pulmonary:      Effort: Pulmonary effort is normal.      Breath sounds: Normal breath sounds. Abdominal:      General: Abdomen is flat. Bowel sounds are normal. There is no distension. Palpations: Abdomen is soft. There is no mass. Tenderness: There is no abdominal tenderness. Musculoskeletal:         General: No swelling or deformity. Cervical back: Normal range of motion and neck supple. Right lower leg: No edema. Left lower leg: No edema.    Lymphadenopathy:      Cervical: No cervical adenopathy. Skin:     General: Skin is warm and dry. Capillary Refill: Capillary refill takes less than 2 seconds. Coloration: Skin is not jaundiced or pale. Findings: Rash present. No erythema. Comments: Hyperpigmented, scally rash distributed in band around the whole neck. Similar rash in between and underneath the breasts    Neurological:      General: No focal deficit present. Mental Status: She is alert and oriented to person, place, and time. Psychiatric:         Mood and Affect: Mood normal.         Behavior: Behavior normal.         Thought Content: Thought content normal.         Judgment: Judgment normal.          Results for orders placed or performed in visit on 09/22/21   AMB POC HEMOGLOBIN A1C   Result Value Ref Range    Hemoglobin A1c (POC) 6.1 %       Assessment/Plan:    Educated on skin care, start topical steroid below. Last a1C 3 months ago was at goal, recheck now, her glucose has been at goal, contonue ozempic without changes. BP still elevated, start antihypertensive as below. Counseled about increasing physical activity and to continue diet and weight loss efforts. To check bp home, bring diary as well as glucose dairy to next visit       ICD-10-CM ICD-9-CM    1. Essential hypertension  I10 401.9 lisinopriL (PRINIVIL, ZESTRIL) 5 mg tablet   2. Flexural eczema  L20.82 691.8 triamcinolone acetonide (KENALOG) 0.1 % ointment   3. Type 2 diabetes mellitus without complication, without long-term current use of insulin (HCC)  E11.9 250.00 HEMOGLOBIN A1C WITH EAG   4. Need for hepatitis C screening test  Z11.59 V73.89 HEPATITIS C AB     Orders Placed This Encounter    HEMOGLOBIN A1C WITH EAG    HEPATITIS C AB    triamcinolone acetonide (KENALOG) 0.1 % ointment     Sig: Apply  to affected area two (2) times a day.  use thin layer     Dispense:  80 g     Refill:  3    lisinopriL (PRINIVIL, ZESTRIL) 5 mg tablet     Sig: Take 1 Tablet by mouth daily for 180 days.     Dispense:  90 Tablet     Refill:  1     lose weight, increase physical activity, bring BP log to office visit, follow low fat diet, follow low salt diet, routine labs ordered, call if any problems, bring glucose diary  Patient Instructions        Learning About the Low FODMAP Diet for Irritable Bowel Syndrome (IBS)  What is the low-FODMAP diet? A low-FODMAP diet is a way to find out what foods give you digestion problems. You stop eating certain high-FODMAP foods for about 2 months. Then you add them back to see how your body reacts. This is called a \"challenge diet. \" A dietitian or doctor can help you follow this diet. FODMAPs are carbohydrates. They are in many types of foods. FODMAP stands for:  · F ermentable. · O ligosaccharides. · D isaccharides. · M onosaccharides. · A nd p olyols. If you have digestive problems, some of these foods can make your symptoms worse. When you are on this diet, you can still eat certain fruits and vegetables. You can also eat certain grains, meats, fish, and lactose-free milks. What is it used for? If you have irritable bowel syndrome (IBS), you can ease your symptoms by not eating some types of foods. Some people also use this diet for inflammatory bowel disease (IBD) or some food intolerances. High-FODMAP foods can be hard to digest. They pull more fluid into your intestines. They are also easily fermented. This can lead to bloating, belly pain, gas, and diarrhea. The low-FODMAP diet can help you figure out what foods to avoid. And it can help you find foods that are easier to digest.  This diet can help with symptoms of some digestive diseases. But it's not a cure. You will still need to manage your condition. How does it work? You will work with a doctor or dietitian when you start the diet. At first, you won't eat any high-FODMAP foods for a few weeks. Go to www.[employer]. "[employer]" to learn more about this diet.  Kamaljit Samayoa also find links to an alice for your phone or other device. You'll find low-FODMAP cookbooks there too. After 6 to 8 weeks, you will start to try high-FODMAP foods again. You will add those foods back to your diet, one group at a time. Your doctor or dietitian will probably have you wait a few days before you add each new group of those foods. Keep a food diary. You can write down the foods you try and note how they make you feel. After a few weeks, you may have a better idea of what foods you should avoid and what foods make you feel your best.  What are the risks? There is some risk of not getting all of the vitamins and nutrients you need on the low-FODMAP diet. These include:  · Folate. · Thiamin. · Vitamin B6.  · Calcium. · Vitamin D. Your dietitian or doctor can help you find other sources of these if needed. This diet may limit your fiber intake. Try to plan your meals to include other sources of fiber. What foods are on the low-FODMAP diet? Here is a guide to foods that you can eat, plus the foods that you should avoid, when you are on the low-FODMAP diet. Grains  Okay to eat: Foods made from grains like arrowroot, buckwheat, corn, millet, and oats. You can also eat potato, quinoa, rice, sorghum, tapioca, and teff. Cereals, pasta, breads, corn tortillas and baked goods made from these grains are also okay. (These grains may be labeled \"gluten-free. \")  Avoid: Grains like wheat, barley, and rye. Avoid ingredients such as bulgur, couscous, durum, and semolina. And avoid cereals, breads, and pastas made from these grains. Avoid chickpea, lentil, and pea flour. Proteins  Okay to eat: Most meat, fish, and eggs without high-FODMAP sauces. You can have small amounts of almonds or hazelnuts (10 nuts). Macadamia nuts, peanuts, pecans, pine nuts, and walnuts are also okay. You can also eat sarah and pumpkin seeds, tofu, and tempeh. Avoid: Beans, chickpeas, lentils, and soybeans. Avoid pistachio and cashew nuts.  And some sausages may have high-FODMAP ingredients. Dairy  Okay to eat: Lactose-free dairy milks. Rice milk and almond milk are okay. So are lactose-free yogurts, kefirs, ice creams, and sorbet from low-FODMAP fruits and sweeteners. (These are often labeled \"lactose-free. \") You can have small amounts (2 Tbsp) of cottage, cream, or ricotta cheese. Hard cheeses like cheddar, [place], ROSS, and Swiss are okay. So are small amounts (1 oz) of aged or ripened cheeses like Brie, blue, and feta. Avoid: Milk, including cow, goat, and sheep. Avoid condensed or evaporated milk, buttermilk, custard, cream, sour cream, yogurt, and ice cream. Avoid soy milk. (Check sauces for dairy ingredients.)  Vegetables  Okay to eat: Bamboo shoots, bell peppers, bok yessenia, up to ½ cup of broccoli or cabbage (red or white), and cucumbers. Eggplant, green beans, lettuce, olives, parsnips, and potatoes are okay to eat. So are pumpkin, rutabaga, seaweed, sprouts, Swiss chard, and spinach. You can eat scallions (green part only) and squash (not butternut). You can eat tomatoes, turnips, watercress, yams, and zucchini. You can also have small amounts of artichoke hearts (from can, 1 oz), carrots, corn (½ cob), and sweet potato (½ cup). Avoid: Artichokes, asparagus, [place] sprouts, mayank cabbage, cauliflower, and celery. And avoid garlic, leeks, mushrooms, okra, onions, scallions (white part), shallots, and peas. Fruits  Okay to eat: Bananas, blueberries, cantaloupe, coconut, grapes, and honeydew. Kiwi, dylon, limes, oranges, passion fruit, papaya, and pineapple are also okay. You can eat plantain, raspberries, rhubarb, star fruit, strawberries, tangelo, and tangerine. You can also have small amounts of dried banana chips (up to 10 chips), dried cranberries (1 Tbsp), and shredded coconut (up to ¼ cup). Avoid: Apples, applesauce, apricots, avocados, blackberries, boysenberries, and cherries. Also avoid dates, figs, grapefruit, guava, lychee, and mangoes.  Don't eat nectarines, peaches, pears, persimmon, plums, prunes, tamarillo, or watermelon. And limit most canned and dried fruits. Oils, spices, condiments, and sweeteners  Okay to eat: Vegetable oils (including garlic infused), butter, ghee, lard, and margarine (no trans fat). You can have most fresh herbs like basil, chives, coriander, christie, parsley, rosemary and thyme. You can have salt, jams made from low-FODMAP fruits, mayonnaise, and mustard. Soy sauce, hot sauce (no garlic), tamari, and vinegar are also okay. Sweeteners that are okay include sugar (sucrose), powdered (confectioner's) sugar, brown sugar, glucose, and maple syrup. You can also have some artificial sweeteners like aspartame, saccharine, and stevia. Avoid: Chutneys, hummus, jellies, garlic sauces, and gravies made with onion or garlic. Avoid pickles, relish, some salad dressings and soup stocks, salsa, and tomato paste. And avoid sauces and other foods with high fructose corn syrup, honey, molasses, and agave. Avoid artificial sweeteners (isomalt, mannitol, malitol, sorbitol, and xylitol). Avoid corn syrup solids, fructose, fruit juice concentrate, and polydextrose. Other foods and drinks  Okay to have: Water, soda water, tonic, soft drinks sweetened with sugar, ½ cup of low-FODMAP fruit juice, and most teas and alcohols. You can also eat foods made with baking powder and soda, cocoa, and gelatin. Avoid: Juices from high-FODMAP fruits and vegetables. And avoid fortified vidhi, chamomile and fennel teas, chicory-based drinks and coffee substitutes, and bouillon cubes. Follow-up care is a key part of your treatment and safety. Be sure to make and go to all appointments, and call your doctor if you are having problems. It's also a good idea to know your test results and keep a list of the medicines you take. Where can you learn more?   Go to http://www.gray.com/  Enter L235 in the search box to learn more about \"Learning About the Low FODMAP Diet for Irritable Bowel Syndrome (IBS). \"  Current as of: December 17, 2020               Content Version: 13.0  © 2006-2021 [employer]wise, [employer]. Care instructions adapted under license by [employer] (which disclaims liability or warranty for this information). If you have questions about a medical condition or this instruction, always ask your healthcare professional. [place] [address] any warranty or liability for your use of this information. Follow-up and Dispositions    · Return in about 6 months (around 6/2/2022).

## 2022-01-18 ENCOUNTER — OFFICE VISIT (OUTPATIENT)
Dept: PODIATRY | Age: 47
End: 2022-01-18
Payer: COMMERCIAL

## 2022-01-18 VITALS
WEIGHT: 293 LBS | DIASTOLIC BLOOD PRESSURE: 93 MMHG | SYSTOLIC BLOOD PRESSURE: 152 MMHG | TEMPERATURE: 97.7 F | HEIGHT: 68 IN | HEART RATE: 64 BPM | BODY MASS INDEX: 44.41 KG/M2

## 2022-01-18 DIAGNOSIS — B35.1 ONYCHOMYCOSIS: Primary | ICD-10-CM

## 2022-01-18 PROCEDURE — 11755 BIOPSY NAIL UNIT: CPT | Performed by: PODIATRIST

## 2022-01-18 PROCEDURE — 99203 OFFICE O/P NEW LOW 30 MIN: CPT | Performed by: PODIATRIST

## 2022-01-18 NOTE — PROGRESS NOTES
1. Have you been to the ER, urgent care clinic since your last visit? Hospitalized since your last visit? No    2. Have you seen or consulted any other health care providers outside of the [address] [place] [address] since your last visit? Include any pap smears or colon screening.  No     Chief Complaint   Patient presents with    Nail Problem     R great toe toenail discoloration/brittle

## 2022-01-28 ENCOUNTER — VIRTUAL VISIT (OUTPATIENT)
Dept: INTERNAL MEDICINE CLINIC | Age: 47
End: 2022-01-28
Payer: COMMERCIAL

## 2022-01-28 DIAGNOSIS — J45.41 MODERATE PERSISTENT ASTHMA WITH ACUTE EXACERBATION: ICD-10-CM

## 2022-01-28 DIAGNOSIS — J45.41 MODERATE PERSISTENT ASTHMA WITH EXACERBATION: Primary | ICD-10-CM

## 2022-01-28 PROCEDURE — 99214 OFFICE O/P EST MOD 30 MIN: CPT | Performed by: INTERNAL MEDICINE

## 2022-01-28 RX ORDER — PREDNISONE 10 MG/1
TABLET ORAL
Qty: 21 TABLET | Refills: 0 | Status: SHIPPED | OUTPATIENT
Start: 2022-01-28 | End: 2022-06-15 | Stop reason: ALTCHOICE

## 2022-01-28 RX ORDER — BUDESONIDE AND FORMOTEROL FUMARATE DIHYDRATE 80; 4.5 UG/1; UG/1
2 AEROSOL RESPIRATORY (INHALATION) EVERY 12 HOURS
Qty: 10.2 G | Refills: 5 | Status: SHIPPED | OUTPATIENT
Start: 2022-01-28 | End: 2022-02-10

## 2022-01-28 RX ORDER — ALBUTEROL SULFATE 0.83 MG/ML
2.5 SOLUTION RESPIRATORY (INHALATION)
Qty: 30 NEBULE | Refills: 2 | Status: SHIPPED | OUTPATIENT
Start: 2022-01-28 | End: 2022-04-28

## 2022-01-28 NOTE — PROGRESS NOTES
Susan Thurston (: 1975) is a 55 y.o. female, established patient, here for evaluation of the following chief complaint(s):   Asthma       ASSESSMENT/PLAN:  Below is the assessment and plan developed based on review of pertinent history, labs, studies, and medications. She has worsening symptoms and exacerbation after covid infection despite daily use of ICS and frequent use of albuterol nebulizations, escalate therapy as below, will also give her short cours eof steroid taper     1. Moderate persistent asthma with exacerbation  -     budesonide-formoteroL (Symbicort) 80-4.5 mcg/actuation HFAA; Take 2 Puffs by inhalation every twelve (12) hours for 180 days. , Normal, Disp-10.2 g, R-5  -     predniSONE (STERAPRED DS) 10 mg dose pack; See administration instruction per 10mg dose pack, Normal, Disp-21 Tablet, R-0  2. Moderate persistent asthma with acute exacerbation  -     albuterol (PROVENTIL VENTOLIN) 2.5 mg /3 mL (0.083 %) nebu; 3 mL by Nebulization route every six (6) hours as needed for Wheezing or Shortness of Breath for up to 90 days. , Normal, Disp-30 Nebule, R-2      No follow-ups on file. SUBJECTIVE/OBJECTIVE:  She says in December she had covid, she did ok, she recovered, had a sinusitis after that, had antibiotics, but now she says that her asthma is uncontrolled, is having persistent cough, nasal congestion, she says she's wheezing intermittently, feels little sob going up and down stairs, mostly at night she has the most wheezing and cough, and feels chest tightness, she has been doing nebulizations every 4 hours. She has productive cough of clear sputum. Review of Systems   Constitutional: Negative for chills, fatigue, fever and unexpected weight change. HENT: Negative for congestion, ear pain, sneezing and sore throat. Eyes: Negative for pain and discharge. Respiratory: Positive for cough and wheezing. Negative for shortness of breath.     Cardiovascular: Negative for chest pain, palpitations and leg swelling. Gastrointestinal: Negative for abdominal pain, blood in stool, constipation and diarrhea. Endocrine: Negative for polydipsia and polyuria. Genitourinary: Negative for difficulty urinating, dysuria, frequency, hematuria and urgency. Musculoskeletal: Negative for arthralgias, back pain and joint swelling. Skin: Negative for rash. Allergic/Immunologic: Negative for environmental allergies and food allergies. Neurological: Negative for dizziness, speech difficulty, weakness, light-headedness, numbness and headaches. Hematological: Negative for adenopathy. Psychiatric/Behavioral: Negative for behavioral problems (Depression), sleep disturbance and suicidal ideas. No data recorded     Physical Exam  Vitals and nursing note reviewed. Constitutional:       Appearance: Normal appearance. HENT:      Head: Normocephalic and atraumatic. Eyes:      General: No scleral icterus. Conjunctiva/sclera: Conjunctivae normal.      Pupils: Pupils are equal, round, and reactive to light. Skin:     Coloration: Skin is not jaundiced or pale. Findings: No rash. Neurological:      Mental Status: She is alert and oriented to person, place, and time. Psychiatric:         Mood and Affect: Mood normal.         Behavior: Behavior normal.         Thought Content: Thought content normal.         Judgment: Judgment normal.             [place] [facility], was evaluated through a synchronous (real-time) audio-video encounter. The patient (or guardian if applicable) is aware that this is a billable service, which includes applicable co-pays. Verbal consent to proceed has been obtained. The visit was conducted pursuant to the emergency declaration under the [facility] [address] authority and the [employer] and [employer] General Act.   Patient identification was verified, and a caregiver was present when appropriate. The patient was located at home in a state where the provider was licensed to provide care. An electronic signature was used to authenticate this note.   -- Prashanth Unger MD

## 2022-01-28 NOTE — PROGRESS NOTES
Chief Complaint   Patient presents with    Asthma     1. Have you been to the ER, urgent care clinic since your last visit? Hospitalized since your last visit?12/17/2021 [facility] Covid+    2. Have you seen or consulted any other health care providers outside of the [address] [place] [address] since your last visit? Include any pap smears or colon screening.  No    Send link to [phone]

## 2022-02-01 DIAGNOSIS — B35.1 ONYCHOMYCOSIS: Primary | ICD-10-CM

## 2022-02-03 NOTE — PROGRESS NOTES
[facility]    Subjective:         Patient is a 55 y.o. female who is being seen as a new pt for thick/discolored toenails. She states he suffered with this issue for the past several years with increasing severity. She denies any overt trauma. She denies any associated pain. She denies any breaks in skin or systemic signs of infection. She denies any changes in her activity level or shoe gear. She states diagnostic testing never been performed to confirm a diagnosis. She states she tried multiple over-the-counter medications without relief of her symptoms. She denies any other pedal complaints    Past Medical History:   Diagnosis Date    Asthma     Hypertension     Obesity     S/P hysterectomy     May 2010     Past Surgical History:   Procedure Laterality Date    HX GYN      excision of bartholins gland    HX HYSTERECTOMY         Family History   Problem Relation Age of Onset    Diabetes Maternal Grandmother     Coronary Art Dis Maternal Grandmother     Diabetes Maternal Grandfather     Coronary Art Dis Maternal Grandfather     Cancer Maternal Grandfather     Coronary Art Dis Paternal Grandmother     Coronary Art Dis Paternal Grandfather     Hypertension Father     Colon Cancer Maternal Uncle       Social History     Tobacco Use    Smoking status: Never Smoker    Smokeless tobacco: Never Used   Substance Use Topics    Alcohol use: Yes     Comment: wine for special occasions     Allergies   Allergen Reactions    Penicillins Hives     Prior to Admission medications    Medication Sig Start Date End Date Taking? Authorizing Provider   budesonide-formoteroL (Symbicort) 80-4.5 mcg/actuation HFAA Take 2 Puffs by inhalation every twelve (12) hours for 180 days.  1/28/22 7/27/22  Anju Mistry MD   predniSONE (STERAPRED DS) 10 mg dose pack See administration instruction per 10mg dose pack 1/28/22   Anju Mistry MD   albuterol (Marbin Portillo VENTOLIN) 2.5 mg /3 mL (0.083 %) nebu 3 mL by Nebulization route every six (6) hours as needed for Wheezing or Shortness of Breath for up to 90 days. 1/28/22 4/28/22  José Fofana MD   triamcinolone acetonide (KENALOG) 0.1 % ointment Apply  to affected area two (2) times a day. use thin layer 12/2/21   Brendon Gaona MD   lisinopriL (PRINIVIL, ZESTRIL) 5 mg tablet Take 1 Tablet by mouth daily for 180 days. 12/2/21 5/31/22  José Fofana MD   semaglutide (OZEMPIC) 0.25 mg or 0.5 mg/dose (2 mg/1.5 ml) subq pen 0.5 mg by SubCUTAneous route every seven (7) days for 336 days. 9/22/21 8/24/22  José Fofana MD   diclofenac (VOLTAREN) 1 % gel Apply 2 g to affected area four (4) times daily. Patient not taking: Reported on 1/28/2022 9/22/21   José Fofana MD   tolterodine (DETROL) 2 mg tablet TAKE 1 TAB BY MOUTH DAILY 9/20/21   José Fofana MD   SUMAtriptan (IMITREX) 50 mg tablet TAKE 1 TABLET AS NEEDED FOR MIGRAINE, CAN REPEAT DOSE AFTER 2 HOURS, NO MORE THAN 2 TABLETS A DAY 7/23/21   José Fofana MD   aspirin-acetaminophen-caffeine (Excedrin Extra Strength) 313-749-54 mg per tablet Take 1 Tablet by mouth every eight (8) hours as needed for Headache. 6/29/21   José Fofana MD   prenatal multivit-ca-min-fe-fa tab Take 1 Tab by mouth daily.     Provider, Historical   alcohol swabs padm Use to check glucose once a day in the morning before breakfast  Indications: diabetes 3/31/21   José Fofana MD   Blood-Glucose Meter monitoring kit Use to check glucose once a day in the mornings before breakfast 3/31/21   José Fofana MD   glucose blood VI test strips (blood glucose test) strip Use to check glucose once a day in the morning before breakfast 3/31/21   José Fofana MD   lancets misc Use to check glucose once a day in the morning before breakfast 3/31/21   Lyric Trejo MD   Oral Medication Containers (Sharps Container) misc Use to dispose pen needle 3/31/21   Lyric Trejo MD   albuterol (PROVENTIL HFA, VENTOLIN HFA, PROAIR HFA) 90 mcg/actuation inhaler Take 1 Puff by inhalation every six (6) hours as needed for Wheezing. 10/7/20   Jhon Maguire MD       Review of Systems   Constitutional: Negative. HENT: Negative. Eyes: Negative. Respiratory: Negative. Cardiovascular: Positive for leg swelling. Gastrointestinal: Negative. Endocrine: Negative. Genitourinary: Negative. Musculoskeletal: Positive for arthralgias. Skin: Negative. Allergic/Immunologic: Positive for immunocompromised state. Neurological: Negative. Hematological: Negative. Psychiatric/Behavioral: Negative. All other systems reviewed and are negative. Objective:     Visit Vitals  BP (!) 152/93 (BP 1 Location: Right lower arm, BP Patient Position: Sitting, BP Cuff Size: Adult)   Pulse 64   Temp 97.7 °F (36.5 °C) (Temporal)   Ht 5' 8\" (1.727 m)   Wt 350 lb (158.8 kg)   BMI 53.22 kg/m²       Physical Exam  Vitals reviewed. Constitutional:       Appearance: She is morbidly obese. Cardiovascular:      Pulses:           Dorsalis pedis pulses are 2+ on the right side and 2+ on the left side. Posterior tibial pulses are 2+ on the right side and 2+ on the left side. Pulmonary:      Effort: Pulmonary effort is normal.   Musculoskeletal:      Right lower leg: Edema present. Left lower leg: Edema present. Right foot: Normal range of motion. No deformity or bunion. Left foot: Normal range of motion. No deformity or bunion. Feet:      Right foot:      Protective Sensation: 10 sites tested. 10 sites sensed. Skin integrity: Skin integrity normal.      Toenail Condition: Right toenails are abnormally thick. Fungal disease present.      Left foot: Protective Sensation: 10 sites tested. 10 sites sensed. Skin integrity: Skin integrity normal.      Toenail Condition: Left toenails are abnormally thick. Fungal disease present. Lymphadenopathy:      Lower Body: No right inguinal adenopathy. No left inguinal adenopathy. Skin:     General: Skin is warm. Capillary Refill: Capillary refill takes 2 to 3 seconds. Neurological:      Mental Status: She is alert and oriented to person, place, and time. Psychiatric:         Mood and Affect: Mood and affect normal.         Behavior: Behavior is cooperative. Data Review: No results found for this or any previous visit (from the past 24 hour(s)). Impression:       ICD-10-CM ICD-9-CM    1. Onychomycosis  B35.1 110.1 BIOPSY, NAIL UNIT         Recommendation:     Patient seen and evaluated in the office  Discussed and educated patient regarding her current medical condition  It was determined that a biopsy of the nail unit would be taken for diagnostic purposes. A small portion of the nail unit (eg, plate, bed, matrix, hyponychium, proximal and lateral nail folds) was sharply removed from the right hallux and sent to pathology for analysis. Anesthesia and hemostasis was utilized as needed. Wound care performed as needed. Pt tolerated well. Instructed pt that when pathology results return, will discuss tx options in more depth. [facility], [address] [facility], 0 [facility]  [address] [place] [address]  O: [phone]  F: [phone]  C: [phone]

## 2022-02-09 ENCOUNTER — TELEPHONE (OUTPATIENT)
Dept: INTERNAL MEDICINE CLINIC | Age: 47
End: 2022-02-09

## 2022-02-09 DIAGNOSIS — J45.41 MODERATE PERSISTENT ASTHMA WITH EXACERBATION: Primary | ICD-10-CM

## 2022-02-09 NOTE — TELEPHONE ENCOUNTER
[facility] for Symbicort 80\405 MCG (120 oral inh)

## 2022-02-10 RX ORDER — BUDESONIDE AND FORMOTEROL FUMARATE DIHYDRATE 80; 4.5 UG/1; UG/1
2 AEROSOL RESPIRATORY (INHALATION) 2 TIMES DAILY
Qty: 10.2 G | Refills: 4 | Status: SHIPPED | OUTPATIENT
Start: 2022-02-10 | End: 2022-09-29 | Stop reason: SDUPTHER

## 2022-02-23 DIAGNOSIS — E11.9 TYPE 2 DIABETES MELLITUS WITHOUT COMPLICATION, WITHOUT LONG-TERM CURRENT USE OF INSULIN (HCC): ICD-10-CM

## 2022-02-24 DIAGNOSIS — E11.9 TYPE 2 DIABETES MELLITUS WITHOUT COMPLICATION, WITHOUT LONG-TERM CURRENT USE OF INSULIN (HCC): ICD-10-CM

## 2022-03-03 DIAGNOSIS — B35.1 ONYCHOMYCOSIS: Primary | ICD-10-CM

## 2022-03-03 LAB
ALBUMIN SERPL-MCNC: 4.2 G/DL (ref 3.8–4.8)
ALP SERPL-CCNC: 84 IU/L (ref 44–121)
ALT SERPL-CCNC: 24 IU/L (ref 0–32)
AST SERPL-CCNC: 13 IU/L (ref 0–40)
BILIRUB DIRECT SERPL-MCNC: 0.1 MG/DL (ref 0–0.4)
BILIRUB SERPL-MCNC: 0.2 MG/DL (ref 0–1.2)
EST. AVERAGE GLUCOSE BLD GHB EST-MCNC: 126 MG/DL
HBA1C MFR BLD: 6 % (ref 4.8–5.6)
HCV AB S/CO SERPL IA: <0.1 S/CO RATIO (ref 0–0.9)
PROT SERPL-MCNC: 7.5 G/DL (ref 6–8.5)

## 2022-03-03 RX ORDER — TERBINAFINE HYDROCHLORIDE 250 MG/1
250 TABLET ORAL DAILY
Qty: 90 TABLET | Refills: 0 | Status: SHIPPED | OUTPATIENT
Start: 2022-03-03 | End: 2022-06-15 | Stop reason: ALTCHOICE

## 2022-03-18 PROBLEM — B35.1 ONYCHOMYCOSIS: Status: ACTIVE | Noted: 2020-08-04

## 2022-03-18 PROBLEM — G43.909 MIGRAINE: Status: ACTIVE | Noted: 2020-08-04

## 2022-03-18 PROBLEM — I10 ESSENTIAL HYPERTENSION: Status: ACTIVE | Noted: 2020-08-04

## 2022-03-18 PROBLEM — E66.01 MORBID OBESITY WITH BMI OF 50.0-59.9, ADULT (HCC): Status: ACTIVE | Noted: 2020-08-05

## 2022-03-18 PROBLEM — R73.03 PREDIABETES: Status: ACTIVE | Noted: 2020-08-04

## 2022-03-18 PROBLEM — J45.909 ALLERGIC BRONCHITIS: Status: ACTIVE | Noted: 2020-08-04

## 2022-03-18 PROBLEM — R63.5 ABNORMAL WEIGHT GAIN: Status: ACTIVE | Noted: 2020-08-04

## 2022-03-18 PROBLEM — J18.9 PNEUMONIA: Status: ACTIVE | Noted: 2020-08-04

## 2022-03-18 PROBLEM — R03.0 ELEVATED BLOOD PRESSURE READING: Status: ACTIVE | Noted: 2020-08-04

## 2022-03-18 PROBLEM — M25.569 KNEE PAIN: Status: ACTIVE | Noted: 2020-08-04

## 2022-03-19 PROBLEM — R60.9 EDEMA: Status: ACTIVE | Noted: 2020-08-04

## 2022-03-19 PROBLEM — J20.9 ACUTE BRONCHITIS: Status: ACTIVE | Noted: 2020-08-04

## 2022-03-19 PROBLEM — E11.9 TYPE 2 DIABETES MELLITUS WITHOUT COMPLICATION, WITHOUT LONG-TERM CURRENT USE OF INSULIN (HCC): Status: ACTIVE | Noted: 2021-10-01

## 2022-03-19 PROBLEM — R51.9 HEADACHE: Status: ACTIVE | Noted: 2020-08-04

## 2022-03-19 PROBLEM — G45.9 TRANSIENT CEREBRAL ISCHEMIA: Status: ACTIVE | Noted: 2020-08-04

## 2022-03-19 PROBLEM — N39.41 URGE INCONTINENCE OF URINE: Status: ACTIVE | Noted: 2020-08-04

## 2022-03-19 PROBLEM — R11.0 NAUSEA: Status: ACTIVE | Noted: 2020-08-04

## 2022-03-19 PROBLEM — G47.33 OBSTRUCTIVE SLEEP APNEA SYNDROME: Status: ACTIVE | Noted: 2020-08-04

## 2022-03-19 PROBLEM — H66.90 OTITIS: Status: ACTIVE | Noted: 2020-08-04

## 2022-06-08 DIAGNOSIS — I10 ESSENTIAL HYPERTENSION: ICD-10-CM

## 2022-06-12 RX ORDER — LISINOPRIL 5 MG/1
TABLET ORAL
Qty: 90 TABLET | Refills: 0 | Status: SHIPPED | OUTPATIENT
Start: 2022-06-12 | End: 2022-06-15 | Stop reason: DRUGHIGH

## 2022-06-14 NOTE — TELEPHONE ENCOUNTER
Called pt lm to call [phone] to schedule a follow up appointment in order to keep getting medication refills.

## 2022-06-15 ENCOUNTER — OFFICE VISIT (OUTPATIENT)
Dept: INTERNAL MEDICINE CLINIC | Age: 47
End: 2022-06-15
Payer: COMMERCIAL

## 2022-06-15 VITALS
SYSTOLIC BLOOD PRESSURE: 147 MMHG | TEMPERATURE: 97.5 F | RESPIRATION RATE: 18 BRPM | HEIGHT: 68 IN | DIASTOLIC BLOOD PRESSURE: 91 MMHG | BODY MASS INDEX: 44.41 KG/M2 | WEIGHT: 293 LBS | OXYGEN SATURATION: 98 % | HEART RATE: 61 BPM

## 2022-06-15 DIAGNOSIS — J30.2 SEASONAL ALLERGIC RHINITIS, UNSPECIFIED TRIGGER: ICD-10-CM

## 2022-06-15 DIAGNOSIS — G89.29 CHRONIC RIGHT-SIDED LOW BACK PAIN WITHOUT SCIATICA: ICD-10-CM

## 2022-06-15 DIAGNOSIS — M54.50 CHRONIC RIGHT-SIDED LOW BACK PAIN WITHOUT SCIATICA: ICD-10-CM

## 2022-06-15 DIAGNOSIS — Z12.11 COLON CANCER SCREENING: ICD-10-CM

## 2022-06-15 DIAGNOSIS — E11.9 TYPE 2 DIABETES MELLITUS WITHOUT COMPLICATION, WITHOUT LONG-TERM CURRENT USE OF INSULIN (HCC): Primary | ICD-10-CM

## 2022-06-15 DIAGNOSIS — I10 ESSENTIAL HYPERTENSION: ICD-10-CM

## 2022-06-15 LAB
GLUCOSE POC: 93 MG/DL
HBA1C MFR BLD HPLC: 5.8 %

## 2022-06-15 PROCEDURE — 83036 HEMOGLOBIN GLYCOSYLATED A1C: CPT | Performed by: INTERNAL MEDICINE

## 2022-06-15 PROCEDURE — 82962 GLUCOSE BLOOD TEST: CPT | Performed by: INTERNAL MEDICINE

## 2022-06-15 PROCEDURE — 99214 OFFICE O/P EST MOD 30 MIN: CPT | Performed by: INTERNAL MEDICINE

## 2022-06-15 RX ORDER — LORATADINE 10 MG/1
10 TABLET ORAL DAILY
Qty: 30 TABLET | Refills: 0 | Status: SHIPPED | OUTPATIENT
Start: 2022-06-15 | End: 2022-07-22

## 2022-06-15 RX ORDER — LISINOPRIL 10 MG/1
10 TABLET ORAL DAILY
Qty: 90 TABLET | Refills: 0 | Status: SHIPPED | OUTPATIENT
Start: 2022-06-15 | End: 2022-06-18 | Stop reason: SINTOL

## 2022-06-15 NOTE — PROGRESS NOTES
Betty Cavanaugh is a 55 y.o. female and presents with Follow-up, Hypertension, Diabetes, and Morbid Obesity    She needs refill on allergy medication     She says she has been walking, has been changing her diet habits and decreasing the portion of the starches but still working on those, she says she's eating more leafy green vegetables now and has slowed down with rice, bread and potatoes. She has continued having intermittent squeezing, twisting, she feels a knot that rolls from front to back in the right side of her lower back, she rubs it and feels little relief, she says it went away for a while and started coming back again in the past 2 weeks every day, she says sometimes she has to stop what she's doing because the pain is too bad. She takes dual ibuprofen and it takes it away, only takes it wwhen it's really bad not often    She has appointment coming up with ophthalmology in  August        Review of Systems  Review of Systems   Constitutional: Negative for chills, fatigue, fever and unexpected weight change. HENT: Negative for congestion, ear pain, sneezing and sore throat. Eyes: Negative for pain and discharge. Respiratory: Negative for cough, shortness of breath and wheezing. Cardiovascular: Negative for chest pain, palpitations and leg swelling. Gastrointestinal: Negative for abdominal pain, blood in stool, constipation and diarrhea. Endocrine: Negative for polydipsia and polyuria. Genitourinary: Negative for difficulty urinating, dysuria, frequency, hematuria and urgency. Musculoskeletal: Positive for back pain. Negative for arthralgias and joint swelling. Skin: Negative for rash. Allergic/Immunologic: Negative for environmental allergies and food allergies. Neurological: Negative for dizziness, speech difficulty, weakness, light-headedness, numbness and headaches. Hematological: Negative for adenopathy.    Psychiatric/Behavioral: Negative for behavioral problems (Depression), sleep disturbance and suicidal ideas. Past Medical History:   Diagnosis Date    Asthma     Hypertension     Obesity     S/P hysterectomy     May 2010     Past Surgical History:   Procedure Laterality Date    HX GYN      excision of bartholins gland    HX HYSTERECTOMY       Social History     Socioeconomic History    Marital status: SINGLE   Tobacco Use    Smoking status: Never Smoker    Smokeless tobacco: Never Used   Vaping Use    Vaping Use: Never used   Substance and Sexual Activity    Alcohol use: Yes     Comment: wine for special occasions    Drug use: No    Sexual activity: Yes     Social Determinants of Health     Financial Resource Strain: Low Risk     Difficulty of Paying Living Expenses: Not very hard   Food Insecurity: No Food Insecurity    Worried About Running Out of Food in the Last Year: Never true    Ran Out of Food in the Last Year: Never true     Family History   Problem Relation Age of Onset    Diabetes Maternal Grandmother     Coronary Art Dis Maternal Grandmother     Diabetes Maternal Grandfather     Coronary Art Dis Maternal Grandfather     Cancer Maternal Grandfather     Coronary Art Dis Paternal Grandmother     Coronary Art Dis Paternal Grandfather     Hypertension Father     Colon Cancer Maternal Uncle      Current Outpatient Medications   Medication Sig Dispense Refill    loratadine (CLARITIN) 10 mg tablet Take 1 Tablet by mouth daily. 30 Tablet 0    semaglutide (OZEMPIC) 0.25 mg or 0.5 mg/dose (2 mg/1.5 ml) subq pen 0.5 mg by SubCUTAneous route every seven (7) days for 336 days. 3 Pen 3    budesonide-formoteroL (SYMBICORT) 80-4.5 mcg/actuation HFAA Take 2 Puffs by inhalation two (2) times a day. 10.2 g 4    triamcinolone acetonide (KENALOG) 0.1 % ointment Apply  to affected area two (2) times a day.  use thin layer 80 g 3    SUMAtriptan (IMITREX) 50 mg tablet TAKE 1 TABLET AS NEEDED FOR MIGRAINE, CAN REPEAT DOSE AFTER 2 HOURS, NO MORE THAN 2 TABLETS A DAY 18 Tablet 1    aspirin-acetaminophen-caffeine (Excedrin Extra Strength) 250-250-65 mg per tablet Take 1 Tablet by mouth every eight (8) hours as needed for Headache. 30 Tablet 0    prenatal multivit-ca-min-fe-fa tab Take 1 Tab by mouth daily.  alcohol swabs padm Use to check glucose once a day in the morning before breakfast  Indications: diabetes 100 Pad 3    Blood-Glucose Meter monitoring kit Use to check glucose once a day in the mornings before breakfast 1 Kit 0    glucose blood VI test strips (blood glucose test) strip Use to check glucose once a day in the morning before breakfast 100 Strip 5    lancets misc Use to check glucose once a day in the morning before breakfast 100 Each 3    albuterol (PROVENTIL HFA, VENTOLIN HFA, PROAIR HFA) 90 mcg/actuation inhaler Take 1 Puff by inhalation every six (6) hours as needed for Wheezing. 1 Inhaler 3    amLODIPine (NORVASC) 5 mg tablet Take 1 Tablet by mouth daily for 90 days. 90 Tablet 0    diclofenac (VOLTAREN) 1 % gel Apply 2 g to affected area four (4) times daily. (Patient not taking: Reported on 1/28/2022) 100 g 2    Oral Medication Containers (Sharps Container) misc Use to dispose pen needle (Patient not taking: Reported on 6/15/2022) 1 Each 5     Allergies   Allergen Reactions    Lisinopril Angioedema    Penicillins Hives       Objective:  Visit Vitals  BP (!) 147/91 (BP 1 Location: Left lower arm, BP Patient Position: Sitting, BP Cuff Size: Adult)   Pulse 61   Temp 97.5 °F (36.4 °C) (Oral)   Resp 18   Ht 5' 8\" (1.727 m)   Wt (!) 350 lb 6.4 oz (158.9 kg)   SpO2 98%   BMI 53.28 kg/m²     Physical Exam:   Physical Exam  Constitutional:       General: She is not in acute distress. Appearance: Normal appearance. She is morbidly obese. HENT:      Head: Normocephalic and atraumatic. Mouth/Throat:      Mouth: Mucous membranes are moist.   Eyes:      Extraocular Movements: Extraocular movements intact. Conjunctiva/sclera: Conjunctivae normal.      Pupils: Pupils are equal, round, and reactive to light. Cardiovascular:      Rate and Rhythm: Normal rate and regular rhythm. Pulses: Normal pulses. Heart sounds: Normal heart sounds. Pulmonary:      Effort: Pulmonary effort is normal.      Breath sounds: Normal breath sounds. Abdominal:      General: Abdomen is flat. Bowel sounds are normal. There is no distension. Palpations: Abdomen is soft. There is no mass. Tenderness: There is no abdominal tenderness. Musculoskeletal:         General: No swelling or deformity. Cervical back: Normal range of motion and neck supple. Right lower leg: No edema. Left lower leg: No edema. Lymphadenopathy:      Cervical: No cervical adenopathy. Skin:     General: Skin is warm and dry. Capillary Refill: Capillary refill takes less than 2 seconds. Coloration: Skin is not jaundiced or pale. Findings: No erythema or rash. Neurological:      General: No focal deficit present. Mental Status: She is alert and oriented to person, place, and time. Psychiatric:         Mood and Affect: Mood normal.         Behavior: Behavior normal.         Thought Content: Thought content normal.         Judgment: Judgment normal.          Results for orders placed or performed in visit on 06/15/22   AMB POC HEMOGLOBIN A1C   Result Value Ref Range    Hemoglobin A1c (POC) 5.8 %   AMB POC GLUCOSE BLOOD, BY GLUCOSE MONITORING DEVICE   Result Value Ref Range    Glucose POC 93 MG/DL       Assessment/Plan:    Diabetes is very well controlled, continue current treatment without changes. All plan below discussed with her. Discussed about diet, lifestyle modification and physical activity      ICD-10-CM ICD-9-CM    1.  Type 2 diabetes mellitus without complication, without long-term current use of insulin ([facility])  E11.9 250.00 CBC WITH AUTOMATED DIFF      METABOLIC PANEL, COMPREHENSIVE      MICROALBUMIN, UR, RAND W/ MICROALB/CREAT RATIO      TSH 3RD GENERATION      LIPID PANEL      AMB POC HEMOGLOBIN A1C      AMB POC GLUCOSE BLOOD, BY GLUCOSE MONITORING DEVICE   2. Colon cancer screening  Z12.11 V76.51 REFERRAL TO GASTROENTEROLOGY   3. Seasonal allergic rhinitis, unspecified trigger  J30.2 477.9 loratadine (CLARITIN) 10 mg tablet   4. Essential hypertension  I10 401.9 DISCONTINUED: lisinopriL (PRINIVIL, ZESTRIL) 10 mg tablet   5. Chronic right-sided low back pain without sciatica  M54.50 724.2 REFERRAL TO PHYSICAL THERAPY    G89.29 338.29      Orders Placed This Encounter    CBC WITH AUTOMATED DIFF    METABOLIC PANEL, COMPREHENSIVE    MICROALBUMIN, UR, RAND W/ MICROALB/CREAT RATIO    TSH 3RD GENERATION    LIPID PANEL    REFERRAL TO GASTROENTEROLOGY     Referral Priority:   Routine     Referral Type:   Consultation     Referral Reason:   Specialty Services Required     Referred to Provider:   Kristy Gracia MD     Number of Visits Requested:   [facility] [id number] [facility]     Referral Priority:   Routine     Referral Type:   PT/OT/ST     Referral Reason:   Specialty Services Required     Number of Visits Requested:   1    AMB POC HEMOGLOBIN A1C    AMB POC GLUCOSE BLOOD, BY GLUCOSE MONITORING DEVICE    DISCONTD: lisinopriL (PRINIVIL, ZESTRIL) 10 mg tablet     Sig: Take 1 Tablet by mouth daily for 90 days. Dispense:  90 Tablet     Refill:  0    loratadine (CLARITIN) 10 mg tablet     Sig: Take 1 Tablet by mouth daily. Dispense:  30 Tablet     Refill:  0     lose weight, increase physical activity, bring BP log to office visit, follow low fat diet, follow low salt diet, routine labs ordered, call if any problems  There are no Patient Instructions on file for this visit. Follow-up and Dispositions    · Return in about 3 months (around 9/15/2022) for hypertension, diabetes.

## 2022-06-15 NOTE — PROGRESS NOTES
1. \"Have you been to the ER, urgent care clinic since your last visit? Hospitalized since your last visit? \" No    2. \"Have you seen or consulted any other health care providers outside of the [address] [place] [address] since your last visit? \" No     3. For patients aged 39-70: Has the patient had a colonoscopy / FIT/ Cologuard? No      If the patient is female:    4. For patients aged 41-77: Has the patient had a mammogram within the past 2 years? Yes - no Care Gap present was done last year at [facility]       5. For patients aged 21-65: Has the patient had a pap smear?  No Pt has had a hysterectomy no care gap present      Chief Complaint   Patient presents with    Follow-up    Hypertension    Diabetes    Morbid Obesity     Visit Vitals  BP (!) 166/104 (BP 1 Location: Left lower arm, BP Patient Position: Sitting, BP Cuff Size: Adult)   Pulse 61   Temp 97.5 °F (36.4 °C) (Oral)   Resp 18   Ht 5' 8\" (1.727 m)   Wt (!) 350 lb 6.4 oz (158.9 kg)   SpO2 98%   BMI 53.28 kg/m²

## 2022-06-16 ENCOUNTER — PATIENT MESSAGE (OUTPATIENT)
Dept: INTERNAL MEDICINE CLINIC | Age: 47
End: 2022-06-16

## 2022-06-16 DIAGNOSIS — I10 ESSENTIAL HYPERTENSION: Primary | ICD-10-CM

## 2022-06-18 RX ORDER — AMLODIPINE BESYLATE 5 MG/1
5 TABLET ORAL DAILY
Qty: 90 TABLET | Refills: 0 | Status: SHIPPED | OUTPATIENT
Start: 2022-06-18 | End: 2022-09-16

## 2022-06-19 NOTE — TELEPHONE ENCOUNTER
From: Sima Meneses  To: Amanda Hummel MD  Sent: 6/16/2022 4:10 PM EDT  Subject: Lisinopril    Good Afternoon    Woke up this morning with swollen lips and I believe its from this meds. Took Benadryl and had to leave work do to the swelling of my face. Can we please change the prescription. I cant afford for these side affects. It didnt happen with the lower dose.

## 2022-07-22 DIAGNOSIS — J30.2 SEASONAL ALLERGIC RHINITIS, UNSPECIFIED TRIGGER: ICD-10-CM

## 2022-07-22 RX ORDER — LORATADINE 10 MG/1
10 TABLET ORAL DAILY
Qty: 90 TABLET | Refills: 0 | Status: SHIPPED | OUTPATIENT
Start: 2022-07-22 | End: 2022-09-29 | Stop reason: SDUPTHER

## 2022-09-23 ENCOUNTER — OFFICE VISIT (OUTPATIENT)
Dept: PODIATRY | Age: 47
End: 2022-09-23
Payer: COMMERCIAL

## 2022-09-23 VITALS
SYSTOLIC BLOOD PRESSURE: 183 MMHG | BODY MASS INDEX: 53.28 KG/M2 | HEART RATE: 73 BPM | DIASTOLIC BLOOD PRESSURE: 100 MMHG | TEMPERATURE: 97.3 F | HEIGHT: 68 IN | OXYGEN SATURATION: 100 %

## 2022-09-23 DIAGNOSIS — B35.1 ONYCHOMYCOSIS: ICD-10-CM

## 2022-09-23 DIAGNOSIS — M79.674 GREAT TOE PAIN, RIGHT: ICD-10-CM

## 2022-09-23 DIAGNOSIS — E11.9 TYPE 2 DIABETES MELLITUS WITHOUT COMPLICATION, WITHOUT LONG-TERM CURRENT USE OF INSULIN (HCC): Primary | ICD-10-CM

## 2022-09-23 DIAGNOSIS — L60.0 INGROWN NAIL OF GREAT TOE OF RIGHT FOOT: ICD-10-CM

## 2022-09-23 PROCEDURE — 11730 AVULSION NAIL PLATE SIMPLE 1: CPT | Performed by: PODIATRIST

## 2022-09-23 PROCEDURE — 3044F HG A1C LEVEL LT 7.0%: CPT | Performed by: PODIATRIST

## 2022-09-23 PROCEDURE — 99214 OFFICE O/P EST MOD 30 MIN: CPT | Performed by: PODIATRIST

## 2022-09-23 RX ORDER — SILVER SULFADIAZINE 10 G/1000G
CREAM TOPICAL DAILY
Qty: 50 G | Refills: 0 | Status: SHIPPED | OUTPATIENT
Start: 2022-09-23

## 2022-09-23 NOTE — PROGRESS NOTES
1. \"Have you been to the ER, urgent care clinic since your last visit? Hospitalized since your last visit? \" No    2. \"Have you seen or consulted any other health care providers outside of the [address] [place] [address] since your last visit? \" No     3. For patients aged 39-70: Has the patient had a colonoscopy / FIT/ Cologuard? No      If the patient is female:    4. For patients aged 41-77: Has the patient had a mammogram within the past 2 years? Yes - no Care Gap present      5. For patients aged 21-65: Has the patient had a pap smear?  Yes - no Care Gap present    Chief Complaint   Patient presents with    Nail Problem     R great toe infection    Toe Pain

## 2022-09-28 NOTE — PROGRESS NOTES
[facility]    Subjective:         Patient is a 55 y.o. female who is being seen as a returning pt for a diabetic pedal exam, management regarding her onychomycosis and an acute complaint of right great toe pain. She states she is close follow-up with her PCP (Dr. Lana Leon). States her last office visit with her PCP was 06/15/2022. Labs her diabetes is being under control, noting her last hemoglobin A1c was 5.8%. She states that she has completed her course of oral terbinafine 250 mg for 90 days and would like to discuss additional treatment options. She states she has suffered with right great toe pain for the past few weeks, with increasing severity. She states her pain rest level of 5 out of 10. She states the pain is exacerbated with pressure and close toed shoes. She denies any overt trauma. She admits to local drainage and erythema but denies any systemic signs of infection. She admits to a decrease in activity level due to the pain.  She denies any other pedal complaints      Past Medical History:   Diagnosis Date    Asthma     Chronic pain     Contact dermatitis and eczema due to cause     Diabetes ([facility] [address]     Headache     Hypertension     Obesity     S/P hysterectomy     May 2010    Stroke [facility])      Past Surgical History:   Procedure Laterality Date    HX GYN      excision of bartholins gland    HX HYSTERECTOMY         Family History   Problem Relation Age of Onset    Diabetes Maternal Grandmother     Coronary Art Dis Maternal Grandmother     Hypertension Maternal Grandmother     Diabetes Maternal Grandfather     Coronary Art Dis Maternal Grandfather     Cancer Maternal Grandfather     Lung Disease Maternal Grandfather     Coronary Art Dis Paternal Grandmother     Coronary Art Dis Paternal Grandfather     Hypertension Father     Colon Cancer Maternal Uncle     Asthma Brother       Social History     Tobacco Use    Smoking status: Never    Smokeless tobacco: Never Substance Use Topics    Alcohol use: Yes     Alcohol/week: 2.0 standard drinks     Types: 2 Glasses of wine per week     Comment: wine for special occasions     Allergies   Allergen Reactions    Lisinopril Angioedema    Penicillins Hives     Prior to Admission medications    Medication Sig Start Date End Date Taking? Authorizing Provider   silver sulfADIAZINE (SILVADENE) 1 % topical cream Apply  to affected area daily. 9/23/22  Yes Martinez Mejia, DPELI   loratadine (CLARITIN) 10 mg tablet TAKE 1 TABLET BY MOUTH DAILY 7/22/22   Thi Smith MD   semaglutide [facility]) 0.25 mg or 0.5 mg/dose (2 mg/1.5 ml) subq pen 0.5 mg by SubCUTAneous route every seven (7) days for 336 days. 2/25/22 1/27/23  Adeline Calvert MD   budesonide-formoteroL (SYMBICORT) 80-4.5 mcg/actuation HFAA Take 2 Puffs by inhalation two (2) times a day. 2/10/22   Thi Smith MD   triamcinolone acetonide (KENALOG) 0.1 % ointment Apply  to affected area two (2) times a day. use thin layer 12/2/21   Erich West MD   diclofenac (VOLTAREN) 1 % gel Apply 2 g to affected area four (4) times daily. Patient not taking: Reported on 1/28/2022 9/22/21   Thi Smith MD   SUMAtriptan (IMITREX) 50 mg tablet TAKE 1 TABLET AS NEEDED FOR MIGRAINE, CAN REPEAT DOSE AFTER 2 HOURS, NO MORE THAN 2 TABLETS A DAY 7/23/21   Thi Smith MD   aspirin-acetaminophen-caffeine (Excedrin Extra Strength) 950-703-32 mg per tablet Take 1 Tablet by mouth every eight (8) hours as needed for Headache. 6/29/21   Thi Smith MD   prenatal multivit-ca-min-fe-fa tab Take 1 Tab by mouth daily.     Provider, Historical   alcohol swabs padm Use to check glucose once a day in the morning before breakfast  Indications: diabetes 3/31/21   Thi Smith MD   Blood-Glucose Meter monitoring kit Use to check glucose once a day in the mornings before breakfast 3/31/21   Jhon Hernandez MD   glucose blood VI test strips (blood glucose test) strip Use to check glucose once a day in the morning before breakfast 3/31/21   Nato Pryor MD   lancets misc Use to check glucose once a day in the morning before breakfast 3/31/21   Paradise Srivastava MD   Oral Medication Containers (Sharps Container) misc Use to dispose pen needle  Patient not taking: Reported on 6/15/2022 3/31/21   Nato Pryor MD   albuterol (PROVENTIL HFA, VENTOLIN HFA, PROAIR HFA) 90 mcg/actuation inhaler Take 1 Puff by inhalation every six (6) hours as needed for Wheezing. 10/7/20   Jhon Hernandez MD       Review of Systems   Constitutional: Negative. HENT: Negative. Eyes: Negative. Respiratory: Negative. Cardiovascular:  Positive for leg swelling. Gastrointestinal: Negative. Endocrine: Negative. Genitourinary: Negative. Musculoskeletal:  Positive for arthralgias. Skin: Negative. Allergic/Immunologic: Positive for immunocompromised state. Neurological: Negative. Hematological: Negative. Psychiatric/Behavioral: Negative. All other systems reviewed and are negative. Objective:     Visit Vitals  BP (!) 183/100 (BP 1 Location: Right lower arm, BP Patient Position: Sitting, BP Cuff Size: Adult)   Pulse 73   Temp 97.3 °F (36.3 °C) (Temporal)   Ht 5' 8\" (1.727 m)   SpO2 100%   BMI 53.28 kg/m²       Physical Exam  Vitals reviewed. Constitutional:       Appearance: She is morbidly obese. Cardiovascular:      Pulses:           Dorsalis pedis pulses are 2+ on the right side and 2+ on the left side. Posterior tibial pulses are 2+ on the right side and 2+ on the left side. Pulmonary:      Effort: Pulmonary effort is normal.   Musculoskeletal:      Right lower leg: Edema present. Left lower leg: Edema present. Right foot: Normal range of motion. No deformity or bunion. Left foot: Normal range of motion. No deformity or bunion. Feet:      Right foot:      Protective Sensation: 10 sites tested. 10 sites sensed. Skin integrity: Erythema present. Toenail Condition: Right toenails are abnormally thick and ingrown. Fungal disease present. Left foot:      Protective Sensation: 10 sites tested. 10 sites sensed. Skin integrity: Skin integrity normal.      Toenail Condition: Left toenails are abnormally thick. Fungal disease present. Lymphadenopathy:      Lower Body: No right inguinal adenopathy. No left inguinal adenopathy. Skin:     General: Skin is warm. Capillary Refill: Capillary refill takes 2 to 3 seconds. Neurological:      Mental Status: She is alert and oriented to person, place, and time. Psychiatric:         Mood and Affect: Mood and affect normal.         Behavior: Behavior is cooperative. Data Review: No results found for this or any previous visit (from the past 24 hour(s)). Impression:       ICD-10-CM ICD-9-CM    1. Type 2 diabetes mellitus without complication, without long-term current use of insulin ([facility])  E11.9 250.00       2. Great toe pain, right  M79.674 729.5       3. Ingrown nail of great toe of right foot  L60.0 703.0             Recommendation:     Patient seen and evaluated in the office  Discussed and educated patient regarding his/her current medical condition as it pertains to his/her diabetes and his/her thick/discolored toenails. Instructed patient to monitor his/her feet daily for possible wound formation, be compliant with all medications and wear supportive shoe gear for wound prevention. Reviewed and discussed most recent lab work, specifically as it pertains to his/her diabetes. As patient has completed her oral course of terbinafine 250 mg for 90 days, encourage patient to monitor her toenails and thin them for acute symptomatic relief.   If needed, topical medication can be prescribed  Lastly, regarding the ingrown toenail to the right great toe, treatment options reviewed (conservative versus procedural). Possible complications reviewed. Patient elected for partial nail avulsion today in the office of the offending nail border to the right great toe. Written and verbal consent obtained. The right great toenail was scrubbed and draped in sterile fashion and anesthetized with 10 cc of 1% lidocaine plain. The offending nail border was removed in its entirety. Appropriate wound care performed. Home wound care instructions given. Patient given a prescription for topical antibiotic cream.  Patient instructed to monitor for local/systemic signs of infection and call the office immediately if needed         Basilio Argueta, [facility], [address] [place] [address] and [facility]  [address] [place] [address]  O: [phone]  F: [phone]  C: [phone]

## 2022-09-29 ENCOUNTER — OFFICE VISIT (OUTPATIENT)
Dept: INTERNAL MEDICINE CLINIC | Age: 47
End: 2022-09-29
Payer: COMMERCIAL

## 2022-09-29 VITALS
WEIGHT: 293 LBS | BODY MASS INDEX: 44.41 KG/M2 | HEIGHT: 68 IN | HEART RATE: 63 BPM | OXYGEN SATURATION: 97 % | SYSTOLIC BLOOD PRESSURE: 130 MMHG | DIASTOLIC BLOOD PRESSURE: 80 MMHG | RESPIRATION RATE: 12 BRPM

## 2022-09-29 DIAGNOSIS — E11.9 TYPE 2 DIABETES MELLITUS WITHOUT COMPLICATION, WITHOUT LONG-TERM CURRENT USE OF INSULIN (HCC): Primary | ICD-10-CM

## 2022-09-29 DIAGNOSIS — I10 ESSENTIAL HYPERTENSION: ICD-10-CM

## 2022-09-29 DIAGNOSIS — J45.41 MODERATE PERSISTENT ASTHMA WITH EXACERBATION: ICD-10-CM

## 2022-09-29 DIAGNOSIS — J30.2 SEASONAL ALLERGIC RHINITIS, UNSPECIFIED TRIGGER: ICD-10-CM

## 2022-09-29 DIAGNOSIS — E66.01 MORBID OBESITY WITH BMI OF 50.0-59.9, ADULT (HCC): ICD-10-CM

## 2022-09-29 DIAGNOSIS — Z12.11 COLON CANCER SCREENING: ICD-10-CM

## 2022-09-29 LAB — HBA1C MFR BLD HPLC: 6.3 %

## 2022-09-29 PROCEDURE — 99214 OFFICE O/P EST MOD 30 MIN: CPT | Performed by: INTERNAL MEDICINE

## 2022-09-29 PROCEDURE — 83036 HEMOGLOBIN GLYCOSYLATED A1C: CPT | Performed by: INTERNAL MEDICINE

## 2022-09-29 RX ORDER — LORATADINE 10 MG/1
10 TABLET ORAL DAILY
Qty: 90 TABLET | Refills: 2 | Status: SHIPPED | OUTPATIENT
Start: 2022-09-29

## 2022-09-29 RX ORDER — BUDESONIDE AND FORMOTEROL FUMARATE DIHYDRATE 80; 4.5 UG/1; UG/1
2 AEROSOL RESPIRATORY (INHALATION) 2 TIMES DAILY
Qty: 10.2 G | Refills: 4 | Status: SHIPPED | OUTPATIENT
Start: 2022-09-29

## 2022-09-29 NOTE — PROGRESS NOTES
Job Parker is a 55 y.o. female and presents with Follow-up    She says she had some hard months mentally, her nephew passed and she has been the most support to her sister so this affected her significantly, so she started snacking more than before, now she says she feels a little better. Htn is ok today, I see it was high last week she saw podiatry, but today is ok, she has been taking her medications  Last A1C was 5.8, she continued ozempic, today is 6.3. she gained 10 lbs since last visit     She says she has not been able to get appointment with PT, she says she called, she was told to call [facility], she called, she was told they will call back, they never did she says. She saw Ophthalmology at [facility] 2 weeks ago, she was told her retina is fine, I have not received anything. Review of Systems  Review of Systems   Constitutional:  Negative for chills, fatigue, fever and unexpected weight change. HENT:  Negative for congestion, ear pain, sneezing and sore throat. Eyes:  Negative for pain and discharge. Respiratory:  Negative for cough, shortness of breath and wheezing. Cardiovascular:  Negative for chest pain, palpitations and leg swelling. Gastrointestinal:  Negative for abdominal pain, blood in stool, constipation and diarrhea. Endocrine: Negative for polydipsia and polyuria. Genitourinary:  Negative for difficulty urinating, dysuria, frequency, hematuria and urgency. Musculoskeletal:  Negative for arthralgias, back pain and joint swelling. Skin:  Negative for rash. Allergic/Immunologic: Negative for environmental allergies and food allergies. Neurological:  Negative for dizziness, speech difficulty, weakness, light-headedness, numbness and headaches. Hematological:  Negative for adenopathy. Psychiatric/Behavioral:  Negative for behavioral problems (Depression), sleep disturbance and suicidal ideas.          Past Medical History:   Diagnosis Date Asthma     Chronic pain     Contact dermatitis and eczema due to cause     Diabetes ([facility] [address]     Headache     Hypertension     Obesity     S/P hysterectomy     May 2010    Stroke [facility])      Past Surgical History:   Procedure Laterality Date    HX GYN      excision of bartholins gland    HX HYSTERECTOMY       Social History     Socioeconomic History    Marital status: SINGLE   Tobacco Use    Smoking status: Never    Smokeless tobacco: Never   Vaping Use    Vaping Use: Never used   Substance and Sexual Activity    Alcohol use: Yes     Alcohol/week: 2.0 standard drinks     Types: 2 Glasses of wine per week     Comment: wine for special occasions    Drug use: Never    Sexual activity: Yes     Partners: Male     Birth control/protection: None     Social Determinants of Health     Financial Resource Strain: Low Risk     Difficulty of Paying Living Expenses: Not very hard   Food Insecurity: No Food Insecurity    Worried About Running Out of Food in the Last Year: Never true    Ran Out of Food in the Last Year: Never true     Family History   Problem Relation Age of Onset    Diabetes Maternal Grandmother     Coronary Art Dis Maternal Grandmother     Hypertension Maternal Grandmother     Diabetes Maternal Grandfather     Coronary Art Dis Maternal Grandfather     Cancer Maternal Grandfather     Lung Disease Maternal Grandfather     Coronary Art Dis Paternal Grandmother     Coronary Art Dis Paternal Grandfather     Hypertension Father     Colon Cancer Maternal Uncle     Asthma Brother      Current Outpatient Medications   Medication Sig Dispense Refill    budesonide-formoteroL (SYMBICORT) 80-4.5 mcg/actuation HFAA Take 2 Puffs by inhalation two (2) times a day. 10.2 g 4    loratadine (CLARITIN) 10 mg tablet Take 1 Tablet by mouth daily. 90 Tablet 2    silver sulfADIAZINE (SILVADENE) 1 % topical cream Apply  to affected area daily.  50 g 0    semaglutide (OZEMPIC) 0.25 mg or 0.5 mg/dose (2 mg/1.5 ml) subq pen 0.5 mg by SubCUTAneous route every seven (7) days for 336 days. 3 Pen 3    SUMAtriptan (IMITREX) 50 mg tablet TAKE 1 TABLET AS NEEDED FOR MIGRAINE, CAN REPEAT DOSE AFTER 2 HOURS, NO MORE THAN 2 TABLETS A DAY 18 Tablet 1    aspirin-acetaminophen-caffeine (Excedrin Extra Strength) 250-250-65 mg per tablet Take 1 Tablet by mouth every eight (8) hours as needed for Headache. 30 Tablet 0    alcohol swabs padm Use to check glucose once a day in the morning before breakfast  Indications: diabetes 100 Pad 3    Blood-Glucose Meter monitoring kit Use to check glucose once a day in the mornings before breakfast 1 Kit 0    glucose blood VI test strips (blood glucose test) strip Use to check glucose once a day in the morning before breakfast 100 Strip 5    lancets misc Use to check glucose once a day in the morning before breakfast 100 Each 3    albuterol (PROVENTIL HFA, VENTOLIN HFA, PROAIR HFA) 90 mcg/actuation inhaler Take 1 Puff by inhalation every six (6) hours as needed for Wheezing. 1 Inhaler 3    Oral Medication Containers (Sharps Container) misc Use to dispose pen needle (Patient not taking: No sig reported) 1 Each 5     Allergies   Allergen Reactions    Lisinopril Angioedema    Penicillins Hives       Objective:  Visit Vitals  /80 (BP 1 Location: Left lower arm, BP Patient Position: Sitting, BP Cuff Size: Small adult)   Pulse 63   Resp 12   Ht 5' 8\" (1.727 m)   Wt (!) 360 lb (163.3 kg)   SpO2 97%   BMI 54.74 kg/m²     Physical Exam:   Physical Exam  Constitutional:       General: She is not in acute distress. Appearance: Normal appearance. HENT:      Head: Normocephalic and atraumatic. Mouth/Throat:      Mouth: Mucous membranes are moist.   Eyes:      Extraocular Movements: Extraocular movements intact. Conjunctiva/sclera: Conjunctivae normal.      Pupils: Pupils are equal, round, and reactive to light. Cardiovascular:      Rate and Rhythm: Normal rate and regular rhythm. Pulses: Normal pulses.       Heart sounds: Normal heart sounds. Pulmonary:      Effort: Pulmonary effort is normal.      Breath sounds: Normal breath sounds. Abdominal:      General: Abdomen is flat. Bowel sounds are normal. There is no distension. Palpations: Abdomen is soft. There is no mass. Tenderness: no abdominal tenderness   Musculoskeletal:         General: No swelling or deformity. Cervical back: Normal range of motion and neck supple. Right lower leg: No edema. Left lower leg: No edema. Lymphadenopathy:      Cervical: No cervical adenopathy. Skin:     General: Skin is warm and dry. Capillary Refill: Capillary refill takes less than 2 seconds. Coloration: Skin is not jaundiced or pale. Findings: No erythema or rash. Neurological:      General: No focal deficit present. Mental Status: She is alert and oriented to person, place, and time. Psychiatric:         Mood and Affect: Mood normal.         Behavior: Behavior normal.         Thought Content: Thought content normal.         Judgment: Judgment normal.        Results for orders placed or performed in visit on 09/29/22   AMB POC HEMOGLOBIN A1C   Result Value Ref Range    Hemoglobin A1c (POC) 6.3 %       Assessment/Plan:    Counseled about weight loss, lax modification, exercising. Revealing medications she needs. She is a podiatry week ago, note reviewed and appreciated. Abdominal discussed with her. Continue Ozempic without changes, continue antihypertensives without changes. Gave her resource for counseling pertaining to what it's in HPI      ICD-10-CM ICD-9-CM    1. Type 2 diabetes mellitus without complication, without long-term current use of insulin ([facility])  E11.9 250.00 AMB POC HEMOGLOBIN A1C       DIABETES FOOT EXAM      2. Moderate persistent asthma with exacerbation  J45.41 493.92 budesonide-formoteroL (SYMBICORT) 80-4.5 mcg/actuation HFAA      3.  Seasonal allergic rhinitis, unspecified trigger  J30.2 477.9 loratadine (CLARITIN) 10 mg tablet      4. Colon cancer screening  Z12.11 V76.51 REFERRAL TO GASTROENTEROLOGY      5. Essential hypertension  I10 401.9       6. Morbid obesity with BMI of 50.0-59.9, [facility]  E66.01 278.01     Z68.43 V85.43         Orders Placed This Encounter    REFERRAL TO GASTROENTEROLOGY     Referral Priority:   Routine     Referral Type:   Consultation     Referral Reason:   Specialty Services Required     Referred to Provider:   Leonora Aj MD     Number of Visits Requested:   1    AMB POC HEMOGLOBIN A1C    HM DIABETES FOOT EXAM    budesonide-formoteroL (SYMBICORT) 80-4.5 mcg/actuation HFAA     Sig: Take 2 Puffs by inhalation two (2) times a day. Dispense:  10.2 g     Refill:  4    loratadine (CLARITIN) 10 mg tablet     Sig: Take 1 Tablet by mouth daily. Dispense:  90 Tablet     Refill:  2     lose weight, increase physical activity, follow low fat diet, follow low salt diet, call if any problems  There are no Patient Instructions on file for this visit. Follow-up and Dispositions    Return in about 3 months (around 12/29/2022).

## 2022-09-29 NOTE — PROGRESS NOTES
Chief Complaint   Patient presents with    Follow-up     Visit Vitals  /80 (BP 1 Location: Left lower arm, BP Patient Position: Sitting, BP Cuff Size: Small adult)   Pulse 63   Resp 12   Ht 5' 8\" (1.727 m)   Wt (!) 360 lb (163.3 kg)   SpO2 97%   BMI 54.74 kg/m²       1. \"Have you been to the ER, urgent care clinic since your last visit? Hospitalized since your last visit? \"  [facility] Pinched nerve    2. \"Have you seen or consulted any other health care providers outside of the [address] [place] [address] since your last visit? \" No     3. For patients aged 39-70: Has the patient had a colonoscopy / FIT/ Cologuard? No      If the patient is female:    4. For patients aged 41-77: Has the patient had a mammogram within the past 2 years? Yes - no Care Gap present      5. For patients aged 21-65: Has the patient had a pap smear?  No

## 2022-12-16 DIAGNOSIS — I10 ESSENTIAL HYPERTENSION: ICD-10-CM

## 2022-12-19 RX ORDER — AMLODIPINE BESYLATE 5 MG/1
TABLET ORAL
Qty: 90 TABLET | Refills: 0 | Status: SHIPPED | OUTPATIENT
Start: 2022-12-19

## 2023-01-05 DIAGNOSIS — E11.9 TYPE 2 DIABETES MELLITUS WITHOUT COMPLICATION, WITHOUT LONG-TERM CURRENT USE OF INSULIN (HCC): ICD-10-CM

## 2023-01-05 NOTE — TELEPHONE ENCOUNTER
[facility] faxed request for     Ozempic 0.25 or 0.5mg/dos 1x2mg pen  Inject under the skin every 7 days    NOV 2/28/23  LOV 9/29/22 Complex Repair And O-T Advancement Flap Text: The defect edges were debeveled with a #15 scalpel blade.  The primary defect was closed partially with a complex linear closure.  Given the location of the remaining defect, shape of the defect and the proximity to free margins an O-T advancement flap was deemed most appropriate for complete closure of the defect.  Using a sterile surgical marker, an appropriate advancement flap was drawn incorporating the defect and placing the expected incisions within the relaxed skin tension lines where possible.    The area thus outlined was incised deep to adipose tissue with a #15 scalpel blade.  The skin margins were undermined to an appropriate distance in all directions utilizing iris scissors.

## 2023-01-20 ENCOUNTER — OFFICE VISIT (OUTPATIENT)
Dept: INTERNAL MEDICINE CLINIC | Age: 48
End: 2023-01-20
Payer: COMMERCIAL

## 2023-01-20 VITALS
BODY MASS INDEX: 44.41 KG/M2 | HEART RATE: 60 BPM | RESPIRATION RATE: 20 BRPM | OXYGEN SATURATION: 98 % | HEIGHT: 68 IN | SYSTOLIC BLOOD PRESSURE: 109 MMHG | WEIGHT: 293 LBS | TEMPERATURE: 97.8 F | DIASTOLIC BLOOD PRESSURE: 88 MMHG

## 2023-01-20 DIAGNOSIS — R23.2 HOT FLASHES: ICD-10-CM

## 2023-01-20 DIAGNOSIS — I10 ESSENTIAL HYPERTENSION: Primary | ICD-10-CM

## 2023-01-20 DIAGNOSIS — E11.9 TYPE 2 DIABETES MELLITUS WITHOUT COMPLICATION, WITHOUT LONG-TERM CURRENT USE OF INSULIN (HCC): ICD-10-CM

## 2023-01-20 DIAGNOSIS — F41.9 ANXIETY: ICD-10-CM

## 2023-01-20 DIAGNOSIS — G43.909 ACUTE MIGRAINE: Primary | ICD-10-CM

## 2023-01-20 DIAGNOSIS — J45.40 MODERATE PERSISTENT ASTHMA WITHOUT COMPLICATION: ICD-10-CM

## 2023-01-20 RX ORDER — SUMATRIPTAN 100 MG/1
TABLET, FILM COATED ORAL
Qty: 30 TABLET | Refills: 0 | Status: SHIPPED | OUTPATIENT
Start: 2023-01-20

## 2023-01-20 RX ORDER — CLONIDINE HYDROCHLORIDE 0.1 MG/1
0.1 TABLET ORAL 2 TIMES DAILY
Qty: 60 TABLET | Refills: 0 | Status: SHIPPED | OUTPATIENT
Start: 2023-01-20 | End: 2023-02-19

## 2023-01-20 RX ORDER — METOCLOPRAMIDE 10 MG/1
10 TABLET ORAL
Qty: 12 TABLET | Refills: 0 | Status: SHIPPED | OUTPATIENT
Start: 2023-01-20 | End: 2023-01-23

## 2023-01-20 RX ORDER — FLUTICASONE PROPIONATE AND SALMETEROL 100; 50 UG/1; UG/1
1 POWDER RESPIRATORY (INHALATION) EVERY 12 HOURS
Qty: 60 EACH | Refills: 3 | Status: SHIPPED | OUTPATIENT
Start: 2023-01-20

## 2023-01-20 NOTE — PROGRESS NOTES
Dimple Wick is a 52 y.o. female and presents with Follow-up (Migraine that won't go away, and asthma acting up)    Acute visit     She says she has been having frequent headache for the past weeek, she says comes and goes throughout the day, in the past 2 days worse and constant, in the left side of her head, sharp first but then throbbing, she says sumatriptan is not helping   She's nt sleeping wel, she says she it worried all the time, at night she remembers things, her head starts active so she can't sleep. She's sleeping every night with the CPAP but she says despite od this she still can not sleep, she still feels tired the next day     She's coughing and wheezing every day, for several months, never received the symbicort for her asthma. She is having hot flashes now as well    Review of Systems  Review of Systems   Constitutional:  Negative for chills, fatigue, fever and unexpected weight change. HENT:  Negative for congestion, ear pain, sneezing and sore throat. Eyes:  Negative for pain and discharge. Respiratory:  Negative for cough, shortness of breath and wheezing. Cardiovascular:  Negative for chest pain, palpitations and leg swelling. Gastrointestinal:  Negative for abdominal pain, blood in stool, constipation and diarrhea. Endocrine: Negative for polydipsia and polyuria. Genitourinary:  Negative for difficulty urinating, dysuria, frequency, hematuria and urgency. Musculoskeletal:  Negative for arthralgias, back pain and joint swelling. Skin:  Negative for rash. Allergic/Immunologic: Negative for environmental allergies and food allergies. Neurological:  Negative for dizziness, speech difficulty, weakness, light-headedness, numbness and headaches. Hematological:  Negative for adenopathy. Psychiatric/Behavioral:  Negative for behavioral problems (Depression), sleep disturbance and suicidal ideas.          Past Medical History:   Diagnosis Date    Asthma     Chronic pain     Contact dermatitis and eczema due to cause     Diabetes ([facility] [address]     Headache     Hypertension     Obesity     S/P hysterectomy     May 2010    Stroke [facility])      Past Surgical History:   Procedure Laterality Date    HX GYN      excision of bartholins gland    HX HYSTERECTOMY       Social History     Socioeconomic History    Marital status: SINGLE   Tobacco Use    Smoking status: Never    Smokeless tobacco: Never   Vaping Use    Vaping Use: Never used   Substance and Sexual Activity    Alcohol use: Yes     Alcohol/week: 2.0 standard drinks     Types: 2 Glasses of wine per week     Comment: wine for special occasions    Drug use: Never    Sexual activity: Yes     Partners: Male     Birth control/protection: None     Social Determinants of Health     Financial Resource Strain: Low Risk     Difficulty of Paying Living Expenses: Not very hard   Food Insecurity: No Food Insecurity    Worried About Running Out of Food in the Last Year: Never true    Ran Out of Food in the Last Year: Never true     Family History   Problem Relation Age of Onset    Diabetes Maternal Grandmother     Coronary Art Dis Maternal Grandmother     Hypertension Maternal Grandmother     Diabetes Maternal Grandfather     Coronary Art Dis Maternal Grandfather     Cancer Maternal Grandfather     Lung Disease Maternal Grandfather     Coronary Art Dis Paternal Grandmother     Coronary Art Dis Paternal Grandfather     Hypertension Father     Colon Cancer Maternal Uncle     Asthma Brother      Current Outpatient Medications   Medication Sig Dispense Refill    SUMAtriptan (IMITREX) 100 mg tablet Take once for headache, can repeat dose after 2 hours if needed. 30 Tablet 0    metoclopramide HCl (REGLAN) 10 mg tablet Take 1 Tablet by mouth Before breakfast, lunch, dinner and at bedtime for 3 days. 12 Tablet 0    fluticasone propion-salmeteroL (Advair Diskus) 100-50 mcg/dose diskus inhaler Take 1 Puff by inhalation every twelve (12) hours.  60 Each 3 cloNIDine HCL (CATAPRES) 0.1 mg tablet Take 1 Tablet by mouth two (2) times a day for 30 days. 60 Tablet 0    semaglutide (OZEMPIC) 0.25 mg or 0.5 mg/dose (2 mg/1.5 ml) subq pen 0.5 mg by SubCUTAneous route every seven (7) days for 336 days. 3 Pen 3    amLODIPine (NORVASC) 5 mg tablet TAKE 1 TABLET BY MOUTH DAILY 90 Tablet 0    loratadine (CLARITIN) 10 mg tablet Take 1 Tablet by mouth daily. 90 Tablet 2    Blood-Glucose Meter monitoring kit Use to check glucose once a day in the mornings before breakfast 1 Kit 0    glucose blood VI test strips (blood glucose test) strip Use to check glucose once a day in the morning before breakfast 100 Strip 5    lancets misc Use to check glucose once a day in the morning before breakfast 100 Each 3    albuterol (PROVENTIL HFA, VENTOLIN HFA, PROAIR HFA) 90 mcg/actuation inhaler Take 1 Puff by inhalation every six (6) hours as needed for Wheezing. 1 Inhaler 3    aspirin-acetaminophen-caffeine (Excedrin Extra Strength) 250-250-65 mg per tablet Take 1 Tablet by mouth every eight (8) hours as needed for Headache. (Patient not taking: Reported on 1/20/2023) 30 Tablet 0    alcohol swabs padm Use to check glucose once a day in the morning before breakfast  Indications: diabetes (Patient not taking: Reported on 1/20/2023) 100 Pad 3     Allergies   Allergen Reactions    Lisinopril Angioedema    Penicillins Hives       Objective:  Visit Vitals  /88 (BP 1 Location: Left upper arm, BP Patient Position: Sitting, BP Cuff Size: Large adult)   Pulse 60   Temp 97.8 °F (36.6 °C) (Temporal)   Resp 20   Ht 5' 8\" (1.727 m)   Wt (!) 355 lb (161 kg)   SpO2 98%   BMI 53.98 kg/m²     Physical Exam:   Physical Exam  Constitutional:       General: She is not in acute distress. Appearance: Normal appearance. HENT:      Head: Normocephalic and atraumatic. Mouth/Throat:      Mouth: Mucous membranes are moist.   Eyes:      Extraocular Movements: Extraocular movements intact. Conjunctiva/sclera: Conjunctivae normal.      Pupils: Pupils are equal, round, and reactive to light. Cardiovascular:      Rate and Rhythm: Normal rate and regular rhythm. Pulses: Normal pulses. Heart sounds: Normal heart sounds. Pulmonary:      Effort: Pulmonary effort is normal.      Breath sounds: Normal breath sounds. Abdominal:      General: Abdomen is flat. Bowel sounds are normal. There is no distension. Palpations: Abdomen is soft. There is no mass. Tenderness: There is no abdominal tenderness. Musculoskeletal:         General: No swelling or deformity. Cervical back: Normal range of motion and neck supple. Right lower leg: No edema. Left lower leg: No edema. Lymphadenopathy:      Cervical: No cervical adenopathy. Skin:     General: Skin is warm and dry. Capillary Refill: Capillary refill takes less than 2 seconds. Coloration: Skin is not jaundiced or pale. Findings: No erythema or rash. Neurological:      General: No focal deficit present. Mental Status: She is alert and oriented to person, place, and time. Psychiatric:         Mood and Affect: Mood normal.         Behavior: Behavior normal.         Thought Content: Thought content normal.         Judgment: Judgment normal.        Results for orders placed or performed in visit on 09/29/22   AMB POC HEMOGLOBIN A1C   Result Value Ref Range    Hemoglobin A1c (POC) 6.3 %       Assessment/Plan:    Try to treat acute migraine as below, increase dose of the sumatriptan. Counseled about behavioral modifications. Asthma remains uncontrolled, she never received the Symbicort, trying to send Advair, I asked her to let me know if she does not receive the medication. We can try clonidine that will help for both vasomotor symptoms related to menopause or perimenopause and anxiety      ICD-10-CM ICD-9-CM    1.  Acute migraine  G43.909 346.90 SUMAtriptan (IMITREX) 100 mg tablet metoclopramide HCl (REGLAN) 10 mg tablet      2. Moderate persistent asthma without complication  R26.29 150.60 fluticasone propion-salmeteroL (Advair Diskus) 100-50 mcg/dose diskus inhaler      3. Hot flashes  R23.2 782.62 cloNIDine HCL (CATAPRES) 0.1 mg tablet      4. Anxiety  F41.9 300.00 cloNIDine HCL (CATAPRES) 0.1 mg tablet        Orders Placed This Encounter    SUMAtriptan (IMITREX) 100 mg tablet     Sig: Take once for headache, can repeat dose after 2 hours if needed. Dispense:  30 Tablet     Refill:  0    metoclopramide HCl (REGLAN) 10 mg tablet     Sig: Take 1 Tablet by mouth Before breakfast, lunch, dinner and at bedtime for 3 days. Dispense:  12 Tablet     Refill:  0    fluticasone propion-salmeteroL (Advair Diskus) 100-50 mcg/dose diskus inhaler     Sig: Take 1 Puff by inhalation every twelve (12) hours. Dispense:  60 Each     Refill:  3    cloNIDine HCL (CATAPRES) 0.1 mg tablet     Sig: Take 1 Tablet by mouth two (2) times a day for 30 days. Dispense:  60 Tablet     Refill:  0     lose weight, increase physical activity, follow low fat diet, follow low salt diet, call if any problems, do labs ordered in June 2022  There are no Patient Instructions on file for this visit.

## 2023-01-20 NOTE — PROGRESS NOTES
Chief Complaint   Patient presents with    Follow-up     Migraine that won't go away, and asthma acting up    Visit Vitals  /88 (BP 1 Location: Left upper arm, BP Patient Position: Sitting, BP Cuff Size: Large adult)   Pulse 60   Temp 97.8 °F (36.6 °C) (Temporal)   Resp 20   Ht 5' 8\" (1.727 m)   Wt (!) 355 lb (161 kg)   SpO2 98%   BMI 53.98 kg/m²    1. Have you been to the ER, urgent care clinic since your last visit? Hospitalized since your last visit? Yes Where: [facility]    2. Have you seen or consulted any other health care providers outside of the [address] [place] [address] since your last visit? Include any pap smears or colon screening.  Yes Where: [facility]

## 2023-01-21 LAB
ALBUMIN/CREAT UR: 5 MG/G CREAT (ref 0–29)
BASOPHILS # BLD AUTO: 0.1 X10E3/UL (ref 0–0.2)
BASOPHILS NFR BLD AUTO: 1 %
CHOLEST SERPL-MCNC: 196 MG/DL (ref 100–199)
CREAT UR-MCNC: 250.7 MG/DL
EOSINOPHIL # BLD AUTO: 0.3 X10E3/UL (ref 0–0.4)
EOSINOPHIL NFR BLD AUTO: 3 %
ERYTHROCYTE [DISTWIDTH] IN BLOOD BY AUTOMATED COUNT: 16.6 % (ref 11.7–15.4)
HCT VFR BLD AUTO: 38.2 % (ref 34–46.6)
HDLC SERPL-MCNC: 51 MG/DL
HGB BLD-MCNC: 12.6 G/DL (ref 11.1–15.9)
IMM GRANULOCYTES # BLD AUTO: 0 X10E3/UL (ref 0–0.1)
IMM GRANULOCYTES NFR BLD AUTO: 0 %
LDLC SERPL CALC-MCNC: 119 MG/DL (ref 0–99)
LYMPHOCYTES # BLD AUTO: 3.6 X10E3/UL (ref 0.7–3.1)
LYMPHOCYTES NFR BLD AUTO: 46 %
MCH RBC QN AUTO: 27.8 PG (ref 26.6–33)
MCHC RBC AUTO-ENTMCNC: 33 G/DL (ref 31.5–35.7)
MCV RBC AUTO: 84 FL (ref 79–97)
MICROALBUMIN UR-MCNC: 11.8 UG/ML
MONOCYTES # BLD AUTO: 0.7 X10E3/UL (ref 0.1–0.9)
MONOCYTES NFR BLD AUTO: 8 %
NEUTROPHILS # BLD AUTO: 3.3 X10E3/UL (ref 1.4–7)
NEUTROPHILS NFR BLD AUTO: 42 %
PLATELET # BLD AUTO: 219 X10E3/UL (ref 150–450)
RBC # BLD AUTO: 4.54 X10E6/UL (ref 3.77–5.28)
TRIGL SERPL-MCNC: 144 MG/DL (ref 0–149)
TSH SERPL DL<=0.005 MIU/L-ACNC: 1.34 UIU/ML (ref 0.45–4.5)
VLDLC SERPL CALC-MCNC: 26 MG/DL (ref 5–40)
WBC # BLD AUTO: 8 X10E3/UL (ref 3.4–10.8)

## 2023-02-28 ENCOUNTER — OFFICE VISIT (OUTPATIENT)
Dept: INTERNAL MEDICINE CLINIC | Age: 48
End: 2023-02-28

## 2023-02-28 VITALS
HEIGHT: 68 IN | TEMPERATURE: 97.5 F | SYSTOLIC BLOOD PRESSURE: 122 MMHG | OXYGEN SATURATION: 99 % | BODY MASS INDEX: 44.41 KG/M2 | WEIGHT: 293 LBS | HEART RATE: 72 BPM | RESPIRATION RATE: 18 BRPM | DIASTOLIC BLOOD PRESSURE: 84 MMHG

## 2023-02-28 DIAGNOSIS — J45.40 MODERATE PERSISTENT ASTHMA WITHOUT COMPLICATION: Primary | ICD-10-CM

## 2023-02-28 DIAGNOSIS — E11.9 TYPE 2 DIABETES MELLITUS WITHOUT COMPLICATION, WITHOUT LONG-TERM CURRENT USE OF INSULIN (HCC): ICD-10-CM

## 2023-02-28 DIAGNOSIS — J45.41 MODERATE PERSISTENT ASTHMA WITH ACUTE EXACERBATION: ICD-10-CM

## 2023-02-28 RX ORDER — MONTELUKAST SODIUM 10 MG/1
10 TABLET ORAL DAILY
Qty: 90 TABLET | Refills: 3 | Status: SHIPPED | OUTPATIENT
Start: 2023-02-28 | End: 2024-02-23

## 2023-02-28 RX ORDER — ALBUTEROL SULFATE 90 UG/1
1 AEROSOL, METERED RESPIRATORY (INHALATION)
Qty: 1 EACH | Refills: 3 | Status: SHIPPED | OUTPATIENT
Start: 2023-02-28

## 2023-02-28 RX ORDER — SEMAGLUTIDE 1.34 MG/ML
1 INJECTION, SOLUTION SUBCUTANEOUS
Qty: 3 EACH | Refills: 3 | Status: SHIPPED | OUTPATIENT
Start: 2023-02-28 | End: 2024-02-23

## 2023-02-28 RX ORDER — BUDESONIDE AND FORMOTEROL FUMARATE DIHYDRATE 160; 4.5 UG/1; UG/1
2 AEROSOL RESPIRATORY (INHALATION) 2 TIMES DAILY
Qty: 10.2 G | Refills: 3 | Status: SHIPPED | OUTPATIENT
Start: 2023-02-28

## 2023-02-28 NOTE — PROGRESS NOTES
Kristin Rowe is a 52 y.o. female and presents with Follow Up [address] says for about 2 weeks her asthma is giving her a hard time, she says it's started with cold symptoms, chills, then cough, cough, wheezing has continued every day so much that she is carrying her nebulizer all the time. She says she feels very tired. She is using the advair as prescribed twice a day, she says that it makes her cough worse when she feels the powder. She says sometimes she feels sob at rest, but  not all the time, no difficulty speaking in full sentences. She has been taking loratadine daily. For the DM which has been controlled she's on Ozempic 0.5 mg. She has gained 8 lbs in 1 month, she says she feels tired and heavy and joints hurt. She  checks her glucose, this morning was 108 after coffee and a toast. The highest in the past month has been 110     She brought [facility] for asthma and migraine for intermittent leave if needed. She's scheduled for March 17 for colonoscopy with Dr. Carmen Guidry     Review of Systems  Review of Systems   Constitutional:  Negative for chills, fatigue, fever and unexpected weight change. HENT:  Negative for congestion, ear pain, sneezing and sore throat. Eyes:  Negative for pain and discharge. Respiratory:  Negative for cough, shortness of breath and wheezing. Cardiovascular:  Negative for chest pain, palpitations and leg swelling. Gastrointestinal:  Negative for abdominal pain, blood in stool, constipation and diarrhea. Endocrine: Negative for polydipsia and polyuria. Genitourinary:  Negative for difficulty urinating, dysuria, frequency, hematuria and urgency. Musculoskeletal:  Negative for arthralgias, back pain and joint swelling. Skin:  Negative for rash. Allergic/Immunologic: Negative for environmental allergies and food allergies. Neurological:  Negative for dizziness, speech difficulty, weakness, light-headedness, numbness and headaches. Hematological:  Negative for adenopathy. Psychiatric/Behavioral:  Negative for behavioral problems (Depression), sleep disturbance and suicidal ideas. Past Medical History:   Diagnosis Date    Asthma     Chronic pain     Contact dermatitis and eczema due to cause     Diabetes ([address]     Headache     Hypertension     Obesity     S/P hysterectomy     May 2010    Stroke [facility])      Past Surgical History:   Procedure Laterality Date    HX GYN      excision of bartholins gland    HX HYSTERECTOMY       Social History     Socioeconomic History    Marital status: SINGLE   Tobacco Use    Smoking status: Never    Smokeless tobacco: Never   Vaping Use    Vaping Use: Never used   Substance and Sexual Activity    Alcohol use: Yes     Alcohol/week: 2.0 standard drinks     Types: 2 Glasses of wine per week     Comment: wine for special occasions    Drug use: Never    Sexual activity: Yes     Partners: Male     Birth control/protection: None     Social Determinants of Health     Financial Resource Strain: Low Risk     Difficulty of Paying Living Expenses: Not very hard   Food Insecurity: No Food Insecurity    Worried About Running Out of Food in the Last Year: Never true    Ran Out of Food in the Last Year: Never true     Family History   Problem Relation Age of Onset    Diabetes Maternal Grandmother     Coronary Art Dis Maternal Grandmother     Hypertension Maternal Grandmother     Diabetes Maternal Grandfather     Coronary Art Dis Maternal Grandfather     Cancer Maternal Grandfather     Lung Disease Maternal Grandfather     Coronary Art Dis Paternal Grandmother     Coronary Art Dis Paternal Grandfather     Hypertension Father     Colon Cancer Maternal Uncle     Asthma Brother      Current Outpatient Medications   Medication Sig Dispense Refill    budesonide-formoteroL (Symbicort) 160-4.5 mcg/actuation HFAA Take 2 Puffs by inhalation two (2) times a day.  10.2 g 3    montelukast (SINGULAIR) 10 mg tablet Take 1 Tablet by mouth daily for 360 days. 90 Tablet 3    albuterol (PROVENTIL HFA, VENTOLIN HFA, PROAIR HFA) 90 mcg/actuation inhaler Take 1 Puff by inhalation every six (6) hours as needed for Wheezing. 1 Each 3    semaglutide (Ozempic) 1 mg/dose (4 mg/3 mL) pnij 1 mg by SubCUTAneous route every seven (7) days for 360 days. 3 Each 3    SUMAtriptan (IMITREX) 100 mg tablet Take once for headache, can repeat dose after 2 hours if needed. 30 Tablet 0    fluticasone propion-salmeteroL (Advair Diskus) 100-50 mcg/dose diskus inhaler Take 1 Puff by inhalation every twelve (12) hours. 60 Each 3    amLODIPine (NORVASC) 5 mg tablet TAKE 1 TABLET BY MOUTH DAILY 90 Tablet 0    loratadine (CLARITIN) 10 mg tablet Take 1 Tablet by mouth daily. 90 Tablet 2    aspirin-acetaminophen-caffeine (Excedrin Extra Strength) 250-250-65 mg per tablet Take 1 Tablet by mouth every eight (8) hours as needed for Headache. (Patient not taking: No sig reported) 30 Tablet 0    alcohol swabs padm Use to check glucose once a day in the morning before breakfast  Indications: diabetes (Patient not taking: Reported on 1/20/2023) 100 Pad 3    Blood-Glucose Meter monitoring kit Use to check glucose once a day in the mornings before breakfast 1 Kit 0    glucose blood VI test strips (blood glucose test) strip Use to check glucose once a day in the morning before breakfast 100 Strip 5    lancets misc Use to check glucose once a day in the morning before breakfast 100 Each 3     Allergies   Allergen Reactions    Lisinopril Angioedema    Penicillins Hives       Objective:  Visit Vitals  /84 (BP 1 Location: Left upper arm, BP Patient Position: Sitting, BP Cuff Size: Large adult long)   Pulse 72   Temp 97.5 °F (36.4 °C) (Temporal)   Resp 18   Ht 5' 8\" (1.727 m)   Wt (!) 363 lb (164.7 kg)   SpO2 99%   BMI 55.19 kg/m²     Physical Exam:   Physical Exam  Constitutional:       General: She is not in acute distress. Appearance: Normal appearance.    HENT:      Head: Normocephalic and atraumatic. Mouth/Throat:      Mouth: Mucous membranes are moist.   Eyes:      Extraocular Movements: Extraocular movements intact. Conjunctiva/sclera: Conjunctivae normal.      Pupils: Pupils are equal, round, and reactive to light. Cardiovascular:      Rate and Rhythm: Normal rate and regular rhythm. Pulses: Normal pulses. Heart sounds: Normal heart sounds. Pulmonary:      Effort: Pulmonary effort is normal.      Breath sounds: Normal breath sounds. Abdominal:      General: Abdomen is flat. Bowel sounds are normal. There is no distension. Palpations: Abdomen is soft. There is no mass. Tenderness: There is no abdominal tenderness. Musculoskeletal:         General: No swelling or deformity. Cervical back: Normal range of motion and neck supple. Right lower leg: No edema. Left lower leg: No edema. Lymphadenopathy:      Cervical: No cervical adenopathy. Skin:     General: Skin is warm and dry. Capillary Refill: Capillary refill takes less than 2 seconds. Coloration: Skin is not jaundiced or pale. Findings: No erythema or rash. Neurological:      General: No focal deficit present. Mental Status: She is alert and oriented to person, place, and time. Psychiatric:         Mood and Affect: Mood normal.         Behavior: Behavior normal.         Thought Content:  Thought content normal.         Judgment: Judgment normal.        Results for orders placed or performed in visit on 01/20/23   LIPID PANEL   Result Value Ref Range    Cholesterol, total 196 100 - 199 mg/dL    Triglyceride 144 0 - 149 mg/dL    HDL Cholesterol 51 >39 mg/dL    VLDL, calculated 26 5 - 40 mg/dL    LDL, calculated 119 (H) 0 - 99 mg/dL   TSH 3RD GENERATION   Result Value Ref Range    TSH 1.340 0.450 - 4.500 uIU/mL   MICROALBUMIN, UR, RAND W/ MICROALB/CREAT RATIO   Result Value Ref Range    Creatinine, urine random 250.7 Not Estab. mg/dL    Microalbumin, urine 11.8 Not Estab. ug/mL    Microalb/Creat ratio (ug/mg creat.) 5 0 - 29 mg/g creat   CBC WITH AUTOMATED DIFF   Result Value Ref Range    WBC 8.0 3.4 - 10.8 x10E3/uL    RBC 4.54 3.77 - 5.28 x10E6/uL    HGB 12.6 11.1 - 15.9 g/dL    HCT 38.2 34.0 - 46.6 %    MCV 84 79 - 97 fL    MCH 27.8 26.6 - 33.0 pg    MCHC 33.0 31.5 - 35.7 g/dL    RDW 16.6 (H) 11.7 - 15.4 %    PLATELET 671 084 - 616 x10E3/uL    NEUTROPHILS 42 Not Estab. %    Lymphocytes 46 Not Estab. %    MONOCYTES 8 Not Estab. %    EOSINOPHILS 3 Not Estab. %    BASOPHILS 1 Not Estab. %    ABS. NEUTROPHILS 3.3 1.4 - 7.0 x10E3/uL    Abs Lymphocytes 3.6 (H) 0.7 - 3.1 x10E3/uL    ABS. MONOCYTES 0.7 0.1 - 0.9 x10E3/uL    ABS. EOSINOPHILS 0.3 0.0 - 0.4 x10E3/uL    ABS. BASOPHILS 0.1 0.0 - 0.2 x10E3/uL    IMMATURE GRANULOCYTES 0 Not Estab. %    ABS. IMM. GRANS. 0.0 0.0 - 0.1 x10E3/uL       Assessment/Plan:    ICD-10-CM ICD-9-CM    1. Moderate persistent asthma without complication  H94.66 696.18 budesonide-formoteroL (Symbicort) 160-4.5 mcg/actuation HFAA      montelukast (SINGULAIR) 10 mg tablet      PULMONARY FUNCTION TEST      2. Moderate persistent asthma with acute exacerbation  J45.41 493.92 albuterol (PROVENTIL HFA, VENTOLIN HFA, PROAIR HFA) 90 mcg/actuation inhaler      3. Type 2 diabetes mellitus without complication, without long-term current use of insulin (HCC)  E11.9 250.00 HEMOGLOBIN A1C WITH EAG      semaglutide (Ozempic) 1 mg/dose (4 mg/3 mL) pnij      METABOLIC PANEL, COMPREHENSIVE        Orders Placed This Encounter    HEMOGLOBIN A1C WITH EAG    METABOLIC PANEL, COMPREHENSIVE    PULMONARY FUNCTION TEST     Standing Status:   Future     Standing Expiration Date:   8/28/2023    budesonide-formoteroL (Symbicort) 160-4.5 mcg/actuation HFAA     Sig: Take 2 Puffs by inhalation two (2) times a day. Dispense:  10.2 g     Refill:  3    montelukast (SINGULAIR) 10 mg tablet     Sig: Take 1 Tablet by mouth daily for 360 days.      Dispense:  90 Tablet Refill:  3    albuterol (PROVENTIL HFA, VENTOLIN HFA, PROAIR HFA) 90 mcg/actuation inhaler     Sig: Take 1 Puff by inhalation every six (6) hours as needed for Wheezing. Dispense:  1 Each     Refill:  3    semaglutide (Ozempic) 1 mg/dose (4 mg/3 mL) pnij     Si mg by SubCUTAneous route every seven (7) days for 360 days. Dispense:  3 Each     Refill:  3       lose weight, increase physical activity, follow low fat diet, follow low salt diet, routine labs ordered, call if any problems  There are no Patient Instructions on file for this visit. Follow-up and Dispositions    Return in about 3 months (around 2023).

## 2023-02-28 NOTE — PROGRESS NOTES
Chief Complaint   Patient presents with    Follow Up Chronic Condition         Visit Vitals  BP (!) 154/86 (BP 1 Location: Left upper arm, BP Patient Position: Sitting)   Pulse 72   Temp 97.5 °F (36.4 °C) (Temporal)   Resp 18   Ht 5' 8\" (1.727 m)   Wt (!) 363 lb (164.7 kg)   SpO2 99%   BMI 55.19 kg/m²         1. \"Have you been to the ER, urgent care clinic since your last visit? Hospitalized since your last visit? \" No    2. \"Have you seen or consulted any other health care providers outside of the [address] [place] [address] since your last visit? \" No     3. For patients aged 39-70: Has the patient had a colonoscopy / FIT/ Cologuard? No      If the patient is female:    4. For patients aged 41-77: Has the patient had a mammogram within the past 2 years? Yes - no Care Gap present      5. For patients aged 21-65: Has the patient had a pap smear? Yes - Care Gap present.  Most recent result on file

## 2023-03-01 LAB
ALBUMIN SERPL-MCNC: 3.9 G/DL (ref 3.8–4.8)
ALBUMIN/GLOB SERPL: 1.3 {RATIO} (ref 1.2–2.2)
ALP SERPL-CCNC: 91 IU/L (ref 44–121)
ALT SERPL-CCNC: 22 IU/L (ref 0–32)
AST SERPL-CCNC: 16 IU/L (ref 0–40)
BILIRUB SERPL-MCNC: <0.2 MG/DL (ref 0–1.2)
BUN SERPL-MCNC: 12 MG/DL (ref 6–24)
BUN/CREAT SERPL: 13 (ref 9–23)
CALCIUM SERPL-MCNC: 9.1 MG/DL (ref 8.7–10.2)
CHLORIDE SERPL-SCNC: 104 MMOL/L (ref 96–106)
CO2 SERPL-SCNC: 24 MMOL/L (ref 20–29)
CREAT SERPL-MCNC: 0.89 MG/DL (ref 0.57–1)
EGFRCR SERPLBLD CKD-EPI 2021: 80 ML/MIN/1.73
EST. AVERAGE GLUCOSE BLD GHB EST-MCNC: 128 MG/DL
GLOBULIN SER CALC-MCNC: 3 G/DL (ref 1.5–4.5)
GLUCOSE SERPL-MCNC: 91 MG/DL (ref 70–99)
HBA1C MFR BLD: 6.1 % (ref 4.8–5.6)
POTASSIUM SERPL-SCNC: 4.5 MMOL/L (ref 3.5–5.2)
PROT SERPL-MCNC: 6.9 G/DL (ref 6–8.5)
SODIUM SERPL-SCNC: 141 MMOL/L (ref 134–144)

## 2023-03-06 ENCOUNTER — TELEPHONE (OUTPATIENT)
Dept: INTERNAL MEDICINE CLINIC | Age: 48
End: 2023-03-06

## 2023-03-06 DIAGNOSIS — J45.41 MODERATE PERSISTENT ASTHMA WITH ACUTE EXACERBATION: Primary | ICD-10-CM

## 2023-03-06 NOTE — TELEPHONE ENCOUNTER
Patient called and stated that she is still coughing and wheezing that she could call back if her symptoms got worse that Dr. Mati Donald would call in an antibiotic for her. Send to [facility].

## 2023-03-07 ENCOUNTER — TRANSCRIBE ORDER (OUTPATIENT)
Dept: SCHEDULING | Age: 48
End: 2023-03-07

## 2023-03-07 DIAGNOSIS — Z12.31 VISIT FOR SCREENING MAMMOGRAM: Primary | ICD-10-CM

## 2023-03-07 RX ORDER — AZITHROMYCIN 250 MG/1
250 TABLET, FILM COATED ORAL SEE ADMIN INSTRUCTIONS
Qty: 6 TABLET | Refills: 0 | Status: SHIPPED | OUTPATIENT
Start: 2023-03-07 | End: 2023-03-12

## 2023-03-07 RX ORDER — PREDNISONE 20 MG/1
TABLET ORAL
Qty: 10.5 TABLET | Refills: 0 | Status: SHIPPED | OUTPATIENT
Start: 2023-03-07 | End: 2023-03-16

## 2023-03-08 NOTE — TELEPHONE ENCOUNTER
Called pt, N/A. Left message that  did send medication to [facility] for her and hope she feels better. Please call office if we can help her with anything else.  Thank you

## 2023-03-17 ENCOUNTER — ANESTHESIA (OUTPATIENT)
Dept: ENDOSCOPY | Age: 48
End: 2023-03-17
Payer: COMMERCIAL

## 2023-03-17 ENCOUNTER — ANESTHESIA EVENT (OUTPATIENT)
Dept: ENDOSCOPY | Age: 48
End: 2023-03-17
Payer: COMMERCIAL

## 2023-03-17 ENCOUNTER — HOSPITAL ENCOUNTER (OUTPATIENT)
Age: 48
Setting detail: OUTPATIENT SURGERY
Discharge: HOME | End: 2023-03-17
Attending: SPECIALIST | Admitting: SPECIALIST
Payer: COMMERCIAL

## 2023-03-17 VITALS
BODY MASS INDEX: 44.41 KG/M2 | DIASTOLIC BLOOD PRESSURE: 61 MMHG | WEIGHT: 293 LBS | RESPIRATION RATE: 16 BRPM | SYSTOLIC BLOOD PRESSURE: 115 MMHG | OXYGEN SATURATION: 100 % | HEIGHT: 68 IN | TEMPERATURE: 97.6 F | HEART RATE: 64 BPM

## 2023-03-17 LAB
GLUCOSE BLD STRIP.AUTO-MCNC: 87 MG/DL (ref 65–117)
SERVICE CMNT-IMP: NORMAL

## 2023-03-17 PROCEDURE — 2709999900 HC NON-CHARGEABLE SUPPLY: Performed by: SPECIALIST

## 2023-03-17 PROCEDURE — 74011250636 HC RX REV CODE- 250/636: Performed by: NURSE ANESTHETIST, CERTIFIED REGISTERED

## 2023-03-17 PROCEDURE — 82962 GLUCOSE BLOOD TEST: CPT

## 2023-03-17 PROCEDURE — 76040000019: Performed by: SPECIALIST

## 2023-03-17 PROCEDURE — 76060000031 HC ANESTHESIA FIRST 0.5 HR: Performed by: SPECIALIST

## 2023-03-17 RX ORDER — MIDAZOLAM HYDROCHLORIDE 1 MG/ML
.25-5 INJECTION, SOLUTION INTRAMUSCULAR; INTRAVENOUS AS NEEDED
Status: DISCONTINUED | OUTPATIENT
Start: 2023-03-17 | End: 2023-03-17 | Stop reason: HOSPADM

## 2023-03-17 RX ORDER — FLUMAZENIL 0.1 MG/ML
0.2 INJECTION INTRAVENOUS
Status: DISCONTINUED | OUTPATIENT
Start: 2023-03-17 | End: 2023-03-17 | Stop reason: HOSPADM

## 2023-03-17 RX ORDER — FENTANYL CITRATE 50 UG/ML
25 INJECTION, SOLUTION INTRAMUSCULAR; INTRAVENOUS AS NEEDED
Status: DISCONTINUED | OUTPATIENT
Start: 2023-03-17 | End: 2023-03-17 | Stop reason: HOSPADM

## 2023-03-17 RX ORDER — DEXTROMETHORPHAN/PSEUDOEPHED 2.5-7.5/.8
1.2 DROPS ORAL
Status: DISCONTINUED | OUTPATIENT
Start: 2023-03-17 | End: 2023-03-17 | Stop reason: HOSPADM

## 2023-03-17 RX ORDER — PROPOFOL 10 MG/ML
INJECTION, EMULSION INTRAVENOUS
Status: DISCONTINUED | OUTPATIENT
Start: 2023-03-17 | End: 2023-03-17 | Stop reason: HOSPADM

## 2023-03-17 RX ORDER — NALOXONE HYDROCHLORIDE 0.4 MG/ML
0.4 INJECTION, SOLUTION INTRAMUSCULAR; INTRAVENOUS; SUBCUTANEOUS
Status: DISCONTINUED | OUTPATIENT
Start: 2023-03-17 | End: 2023-03-17 | Stop reason: HOSPADM

## 2023-03-17 RX ORDER — SODIUM CHLORIDE 9 MG/ML
INJECTION, SOLUTION INTRAVENOUS
Status: DISCONTINUED | OUTPATIENT
Start: 2023-03-17 | End: 2023-03-17 | Stop reason: HOSPADM

## 2023-03-17 RX ORDER — PROPOFOL 10 MG/ML
INJECTION, EMULSION INTRAVENOUS AS NEEDED
Status: DISCONTINUED | OUTPATIENT
Start: 2023-03-17 | End: 2023-03-17 | Stop reason: HOSPADM

## 2023-03-17 RX ORDER — SODIUM CHLORIDE 9 MG/ML
50 INJECTION, SOLUTION INTRAVENOUS CONTINUOUS
Status: DISCONTINUED | OUTPATIENT
Start: 2023-03-17 | End: 2023-03-17 | Stop reason: HOSPADM

## 2023-03-17 RX ADMIN — PROPOFOL 50 MG: 10 INJECTION, EMULSION INTRAVENOUS at 09:39

## 2023-03-17 RX ADMIN — PROPOFOL 200 MCG/KG/MIN: 10 INJECTION, EMULSION INTRAVENOUS at 09:39

## 2023-03-17 RX ADMIN — SODIUM CHLORIDE: 9 INJECTION, SOLUTION INTRAVENOUS at 09:32

## 2023-03-17 NOTE — ANESTHESIA PREPROCEDURE EVALUATION
Relevant Problems   No relevant active problems       Anesthetic History               Review of Systems / Medical History  Patient summary reviewed, nursing notes reviewed and pertinent labs reviewed    Pulmonary        Sleep apnea: CPAP    Asthma : well controlled       Neuro/Psych       CVA: no residual symptoms       Cardiovascular    Hypertension: well controlled              Exercise tolerance: <4 METS     GI/Hepatic/Renal                Endo/Other    Diabetes: well controlled, type 2, using insulin    Morbid obesity     Other Findings              Physical Exam    Airway  Mallampati: III  TM Distance: > 6 cm  Neck ROM: short neck   Mouth opening: Normal     Cardiovascular    Rhythm: regular  Rate: normal         Dental    Dentition: Caps/crowns     Pulmonary  Breath sounds clear to auscultation               Abdominal  GI exam deferred       Other Findings            Anesthetic Plan    ASA: 3  Anesthesia type: MAC          Induction: Intravenous  Anesthetic plan and risks discussed with: Patient

## 2023-03-17 NOTE — H&P
52 y.o. female for open access colonoscopy for screening   Additional data for completion of the targeted pre-endoscopy H&P will be provided under 'H&P interval notes'. Please see that document which will be attached to this.   Ramandeep Schofield MD

## 2023-03-17 NOTE — INTERVAL H&P NOTE
Pre-Endoscopy H&P Update  Chief complaint/HPI/ROS:  The indication for the procedure, the patient's history and the patient's current medications are reviewed prior to the procedure and that data is reported on the H&P to which this document is attached. Any significant complaints with regard to organ systems will be noted, and if not mentioned then a review of systems is not contributory. Past Medical History:   Diagnosis Date    Asthma     Chronic pain     Contact dermatitis and eczema due to cause     Diabetes ([facility] [address]     Headache     Hypertension     Obesity     S/P hysterectomy     May 2010    Stroke [facility])       Past Surgical History:   Procedure Laterality Date    HX GYN      excision of bartholins gland    HX HYSTERECTOMY       Social   Social History     Tobacco Use    Smoking status: Never    Smokeless tobacco: Never   Substance Use Topics    Alcohol use: Yes     Alcohol/week: 2.0 standard drinks     Types: 2 Glasses of wine per week     Comment: wine for special occasions      Family History   Problem Relation Age of Onset    Diabetes Maternal Grandmother     Coronary Art Dis Maternal Grandmother     Hypertension Maternal Grandmother     Diabetes Maternal Grandfather     Coronary Art Dis Maternal Grandfather     Cancer Maternal Grandfather     Lung Disease Maternal Grandfather     Coronary Art Dis Paternal Grandmother     Coronary Art Dis Paternal Grandfather     Hypertension Father     Colon Cancer Maternal Uncle     Asthma Brother       Allergies   Allergen Reactions    Lisinopril Angioedema    Penicillins Hives      Prior to Admission Medications   Prescriptions Last Dose Informant Patient Reported? Taking? Blood-Glucose Meter monitoring kit   No No   Sig: Use to check glucose once a day in the mornings before breakfast   SUMAtriptan (IMITREX) 100 mg tablet 2/17/2023  No Yes   Sig: Take once for headache, can repeat dose after 2 hours if needed.    albuterol (PROVENTIL HFA, VENTOLIN HFA, PROAIR HFA) 90 mcg/actuation inhaler 3/10/2023  No Yes   Sig: Take 1 Puff by inhalation every six (6) hours as needed for Wheezing. alcohol swabs padm   No No   Sig: Use to check glucose once a day in the morning before breakfast  Indications: diabetes   Patient not taking: Reported on 2023   amLODIPine (NORVASC) 5 mg tablet 3/10/2023  No Yes   Sig: TAKE 1 TABLET BY MOUTH DAILY   aspirin-acetaminophen-caffeine (Excedrin Extra Strength) 250-250-65 mg per tablet Not Taking  No No   Sig: Take 1 Tablet by mouth every eight (8) hours as needed for Headache. Patient not taking: No sig reported   budesonide-formoteroL (Symbicort) 160-4.5 mcg/actuation HFAA Not Taking  No No   Sig: Take 2 Puffs by inhalation two (2) times a day. Patient not taking: Reported on 3/17/2023   fluticasone propion-salmeteroL (Advair Diskus) 100-50 mcg/dose diskus inhaler Not Taking  No No   Sig: Take 1 Puff by inhalation every twelve (12) hours. Patient not taking: Reported on 3/17/2023   glucose blood VI test strips (blood glucose test) strip   No No   Sig: Use to check glucose once a day in the morning before breakfast   lancets misc   No No   Sig: Use to check glucose once a day in the morning before breakfast   loratadine (CLARITIN) 10 mg tablet Not Taking  No No   Sig: Take 1 Tablet by mouth daily. Patient not taking: Reported on 3/17/2023   montelukast (SINGULAIR) 10 mg tablet 3/16/2023  No Yes   Sig: Take 1 Tablet by mouth daily for 360 days. predniSONE (DELTASONE) 20 mg tablet   No No   Sig: Take 2 Tablets by mouth daily (with breakfast) for 3 days, THEN 1 Tablet daily (with breakfast) for 3 days, THEN 0.5 Tablets daily (with breakfast) for 3 days. semaglutide (Ozempic) 1 mg/dose (4 mg/3 mL) pnij 3/15/2023  No No   Si mg by SubCUTAneous route every seven (7) days for 360 days. Facility-Administered Medications: None       PHYSICAL EXAM:  The patient is examined immediately prior to the procedure.   Visit Vitals  BP (!) 132/93 Pulse 71   Temp 98 °F (36.7 °C)   Resp 18   Ht 5' 8\" (1.727 m)   Wt (!) 161.3 kg (355 lb 9.6 oz)   SpO2 99%   Breastfeeding No   BMI 54.07 kg/m²     Gen: Appears comfortable, no distress. Pulm: comfortable respirations with no abnormal audible breath sounds  HEART: well perfused, no abnormal audible heart sounds  GI: abdomen flat. PLAN:  Informed consent discussion held, patient afforded an opportunity to ask questions and all questions answered. After being advised of the risks, benefits, and alternatives, the patient requested that we proceed and indicated so on a written consent form. Will proceed with procedure as planned.   Cedric Stearns MD

## 2023-03-17 NOTE — PROGRESS NOTES
Endoscopy discharge instructions have been reviewed and given to patient. The patient verbalized understanding and acceptance of instructions. Dr. Robin Kaiser discussed with patient procedure findings and next steps.

## 2023-03-17 NOTE — PERIOP NOTES
7176 Patient has been evaluated by anesthesia pre-procedure. Patient alert and oriented. Vital signs will not be charted by the Endoscopy nurse. All vitals, airway, and loc are monitored by anesthesia staff Laure Renteria CRNA throughout procedure. 9636 Endoscope was pre-cleaned at bedside immediately following procedure by endo Dioni elizondo. 408 Patient tolerated procedure. Abdomen soft and patient arousable and voices no complaints. Patient transported to endoscopy recovery area. Report given to post procedure RN,   Jose Antonio Oro RN.

## 2023-03-17 NOTE — PROCEDURES
[address] [facility] [address]  [phone]      2023    Colonoscopy Procedure Note  Tamar Boyle  :  1975  Karla Medical Record Number: [id number]    Indications:     Screening colonoscopy  PCP:  Jenni Diaz MD  Anesthesia/Sedation: Conscious Sedation/Moderate Sedation/GETA, see notes  Endoscopist:  Dr. Arnie Messina  Complications:  None  Estimated Blood Loss:  None    Permit:  The indications, risks, benefits and alternatives were reviewed with the patient or their decision maker who was provided an opportunity to ask questions and all questions were answered. The specific risks of colonoscopy with conscious sedation were reviewed, including but not limited to anesthetic complication, bleeding, adverse drug reaction, missed lesion, infection, IV site reactions, and intestinal perforation which would lead to the need for surgical repair. Alternatives to colonoscopy including radiographic imaging, observation without testing, or laboratory testing were reviewed including the limitations of those alternatives. After considering the options and having all their questions answered, the patient or their decision maker provided both verbal and written consent to proceed. Procedure in Detail:  After obtaining informed consent, positioning of the patient in the left lateral decubitus position, and conduction of a pre-procedure pause or \"time out\" the endoscope was introduced into the anus and advanced to the cecum, which was identified by the ileocecal valve and appendiceal orifice. The quality of the colonic preparation was good. A careful inspection was made as the colonoscope was withdrawn, findings and interventions are described below. Findings:    There is diverticulosis in the sigmoid colon without complications such as bleeding, inflammatory change, or luminal narrowing. Specimens:    none      Complications:   None; patient tolerated the procedure well. Impression:  Otherwise normal colonoscopy to the cecum    Recommendations:     - For colon cancer screening in this average-risk patient, colonoscopy may be repeated in 10 years. - Follow up with primary care physician. Thank you for entrusting me with this patient's care. Please do not hesitate to contact me with any questions or if I can be of assistance with any of your other patients' GI needs. Signed By: August Cunningham MD                        March 17, 2023      Surgical assistant none. Implants none unless specified.

## 2023-03-17 NOTE — PROGRESS NOTES
Simon Gordon  1975  [id number]    Situation:  Verbal report received from:   Eric Yates RN   Procedure: Procedure(s):  COLONOSCOPY    Background:    Preoperative diagnosis: SCREENING COLONOSCOPY  FAMILY HX OF COLON CANCER  Postoperative diagnosis: Diverticulosis    :  Dr. Jacob Vazquez   Assistant(s): Endoscopy Technician-1: Vandana Lubin  Endoscopy RN-1: Moshe Louie RN    Specimens: * No specimens in log *  H. Pylori  no    Assessment:  Intra-procedure medications     Anesthesia gave intra-procedure sedation and medications, see anesthesia flow sheet yes    Intravenous fluids: NS@ KVO     Vital signs stable   yes    Abdominal assessment: round and soft   yes    Recommendation:  Discharge patient per MD order  yes.   Return to floor  outpatient  Family or Friend   friend   Permission to share finding with family or friend yes

## 2023-03-17 NOTE — ANESTHESIA POSTPROCEDURE EVALUATION
Procedure(s):  COLONOSCOPY. MAC    Anesthesia Post Evaluation      Multimodal analgesia: multimodal analgesia not used between 6 hours prior to anesthesia start to PACU discharge  Patient location during evaluation: bedside  Patient participation: complete - patient participated  Level of consciousness: awake and alert and responsive to verbal stimuli  Pain score: 0  Pain management: adequate  Airway patency: patent  Anesthetic complications: no  Cardiovascular status: acceptable and hemodynamically stable  Respiratory status: acceptable and spontaneous ventilation  Hydration status: acceptable  Post anesthesia nausea and vomiting:  none  Final Post Anesthesia Temperature Assessment:  Normothermia (36.0-37.5 degrees C)      INITIAL Post-op Vital signs:   Vitals Value Taken Time   /56 03/17/23 1008   Temp     Pulse 69 03/17/23 1008   Resp 18 03/17/23 1008   SpO2 100 % 03/17/23 1008   Vitals shown include unvalidated device data.

## 2023-03-17 NOTE — DISCHARGE INSTRUCTIONS
[address] [facility] SWETHA August MD  [phone]      March 17, 2023    Bhupinder Webb  YOB: 1975    COLONOSCOPY DISCHARGE INSTRUCTIONS    If there is redness at IV site you should apply warm compress to area. If redness or soreness persist contact Dr. Susu August' or your primary care doctor. There may be a slight amount of blood passed from the rectum. Gaseous discomfort may develop, but walking, belching will help relieve this. You may not operate a vehicle for 12 hours  You may not operate machinery or dangerous appliances for rest of today  You may not drink alcoholic beverages for 12 hours  Avoid making any critical decisions for 24 hours    DIET:  You may resume your normal diet, but some patients find that heavy or large meals may lead to indigestion or vomiting. I suggest a light meal as first food intake. MEDICATIONS:  The use of some over-the-counter pain medication may lead to bleeding after colon biopsies or polyp removal.  Tylenol (also called acetaminophen) is safe to take even if you have had colonoscopy with polyp removal.  Based on the procedure you had today you may safely take aspirin or aspirin-like products for the next ten (10) days. Remember that Tylenol (also called acetaminophen) is safe to take after colonoscopy even if you have had biopsies or polyps removed. ACTIVITY:  You may resume your normal household activities, but it is recommended that you spend the remainder of the day resting -  avoid any strenuous activity. CALL DR. Kirit Greene' OFFICE IF:  Increasing pain, nausea, vomiting  Abdominal distension (swelling)  Significant new or increased bleeding (oral or rectal)  Fever/Chills  Chest pain/shortness of breath                       Additional instructions:   Great news, no polyps and no colon cancer. Next colonoscopy 10 years. It was an honor to be your doctor today. Please let me or my office staff know if you have any feedback about today's procedure. Puja Morales MD    Colonoscopy saves lives, and can prevent colon cancer. Everyone aged 48 or older needs colonoscopy.   Tell your family and friends: get the test!

## 2023-03-29 ENCOUNTER — TELEPHONE (OUTPATIENT)
Dept: OBGYN CLINIC | Age: 48
End: 2023-03-29

## 2023-03-29 NOTE — TELEPHONE ENCOUNTER
Patient sent my chart message requesting an annual appointment, left message for her to call office to schedule

## 2023-04-22 DIAGNOSIS — Z12.31 VISIT FOR SCREENING MAMMOGRAM: Primary | ICD-10-CM

## 2023-05-20 RX ORDER — FLUTICASONE PROPIONATE AND SALMETEROL 100; 50 UG/1; UG/1
1 POWDER RESPIRATORY (INHALATION) EVERY 12 HOURS
COMMUNITY
Start: 2023-01-20

## 2023-05-20 RX ORDER — ACETAMINOPHEN, ASPIRIN AND CAFFEINE 250; 250; 65 MG/1; MG/1; MG/1
1 TABLET, FILM COATED ORAL EVERY 8 HOURS PRN
COMMUNITY
Start: 2021-06-29

## 2023-05-20 RX ORDER — LORATADINE 10 MG/1
10 TABLET ORAL DAILY
COMMUNITY
Start: 2022-09-29 | End: 2023-06-05 | Stop reason: SDUPTHER

## 2023-05-20 RX ORDER — SEMAGLUTIDE 1.34 MG/ML
1 INJECTION, SOLUTION SUBCUTANEOUS
COMMUNITY
Start: 2023-02-28 | End: 2023-06-05 | Stop reason: SDUPTHER

## 2023-05-20 RX ORDER — ALBUTEROL SULFATE 90 UG/1
1 AEROSOL, METERED RESPIRATORY (INHALATION) EVERY 6 HOURS PRN
COMMUNITY
Start: 2023-02-28

## 2023-05-20 RX ORDER — AMLODIPINE BESYLATE 5 MG/1
1 TABLET ORAL DAILY
COMMUNITY
Start: 2022-12-19

## 2023-05-20 RX ORDER — BUDESONIDE AND FORMOTEROL FUMARATE DIHYDRATE 160; 4.5 UG/1; UG/1
2 AEROSOL RESPIRATORY (INHALATION) 2 TIMES DAILY
COMMUNITY
Start: 2023-02-28

## 2023-05-20 RX ORDER — SUMATRIPTAN 100 MG/1
TABLET, FILM COATED ORAL
COMMUNITY
Start: 2023-01-20

## 2023-05-20 RX ORDER — MONTELUKAST SODIUM 10 MG/1
10 TABLET ORAL DAILY
Qty: 30 TABLET | Refills: 11 | COMMUNITY
Start: 2023-02-28 | End: 2024-02-23

## 2023-05-20 RX ORDER — LANCETS 30 GAUGE
EACH MISCELLANEOUS
COMMUNITY
Start: 2021-03-31

## 2023-06-05 ENCOUNTER — OFFICE VISIT (OUTPATIENT)
Facility: CLINIC | Age: 48
End: 2023-06-05
Payer: COMMERCIAL

## 2023-06-05 VITALS
SYSTOLIC BLOOD PRESSURE: 119 MMHG | TEMPERATURE: 97.8 F | DIASTOLIC BLOOD PRESSURE: 80 MMHG | OXYGEN SATURATION: 98 % | HEIGHT: 68 IN | WEIGHT: 293 LBS | BODY MASS INDEX: 44.41 KG/M2 | HEART RATE: 89 BPM

## 2023-06-05 DIAGNOSIS — J45.40 MODERATE PERSISTENT ASTHMA, UNCOMPLICATED: ICD-10-CM

## 2023-06-05 DIAGNOSIS — R53.83 OTHER FATIGUE: ICD-10-CM

## 2023-06-05 DIAGNOSIS — G47.33 OSA ON CPAP: ICD-10-CM

## 2023-06-05 DIAGNOSIS — M54.50 CHRONIC RIGHT-SIDED LOW BACK PAIN WITHOUT SCIATICA: ICD-10-CM

## 2023-06-05 DIAGNOSIS — E11.9 TYPE 2 DIABETES MELLITUS WITHOUT COMPLICATION, WITHOUT LONG-TERM CURRENT USE OF INSULIN (HCC): Primary | ICD-10-CM

## 2023-06-05 DIAGNOSIS — L02.32 FURUNCULOSIS OF BUTTOCK: ICD-10-CM

## 2023-06-05 DIAGNOSIS — Z99.89 OSA ON CPAP: ICD-10-CM

## 2023-06-05 DIAGNOSIS — G89.29 CHRONIC RIGHT-SIDED LOW BACK PAIN WITHOUT SCIATICA: ICD-10-CM

## 2023-06-05 LAB — HBA1C MFR BLD: 6.1 %

## 2023-06-05 PROCEDURE — 3079F DIAST BP 80-89 MM HG: CPT | Performed by: INTERNAL MEDICINE

## 2023-06-05 PROCEDURE — 83036 HEMOGLOBIN GLYCOSYLATED A1C: CPT | Performed by: INTERNAL MEDICINE

## 2023-06-05 PROCEDURE — 99214 OFFICE O/P EST MOD 30 MIN: CPT | Performed by: INTERNAL MEDICINE

## 2023-06-05 PROCEDURE — 3074F SYST BP LT 130 MM HG: CPT | Performed by: INTERNAL MEDICINE

## 2023-06-05 PROCEDURE — 3044F HG A1C LEVEL LT 7.0%: CPT | Performed by: INTERNAL MEDICINE

## 2023-06-05 RX ORDER — SEMAGLUTIDE 1.34 MG/ML
1 INJECTION, SOLUTION SUBCUTANEOUS
Qty: 9 ML | Refills: 3 | Status: SHIPPED | OUTPATIENT
Start: 2023-06-05 | End: 2024-05-30

## 2023-06-05 RX ORDER — LORATADINE 10 MG/1
10 TABLET ORAL DAILY
Qty: 90 TABLET | Refills: 3 | Status: SHIPPED | OUTPATIENT
Start: 2023-06-05 | End: 2024-05-30

## 2023-06-05 SDOH — ECONOMIC STABILITY: INCOME INSECURITY: HOW HARD IS IT FOR YOU TO PAY FOR THE VERY BASICS LIKE FOOD, HOUSING, MEDICAL CARE, AND HEATING?: NOT HARD AT ALL

## 2023-06-05 SDOH — ECONOMIC STABILITY: HOUSING INSECURITY
IN THE LAST 12 MONTHS, WAS THERE A TIME WHEN YOU DID NOT HAVE A STEADY PLACE TO SLEEP OR SLEPT IN A SHELTER (INCLUDING NOW)?: NO

## 2023-06-05 SDOH — ECONOMIC STABILITY: FOOD INSECURITY: WITHIN THE PAST 12 MONTHS, THE FOOD YOU BOUGHT JUST DIDN'T LAST AND YOU DIDN'T HAVE MONEY TO GET MORE.: NEVER TRUE

## 2023-06-05 SDOH — ECONOMIC STABILITY: FOOD INSECURITY: WITHIN THE PAST 12 MONTHS, YOU WORRIED THAT YOUR FOOD WOULD RUN OUT BEFORE YOU GOT MONEY TO BUY MORE.: NEVER TRUE

## 2023-06-05 ASSESSMENT — ENCOUNTER SYMPTOMS
RESPIRATORY NEGATIVE: 1
GASTROINTESTINAL NEGATIVE: 1

## 2023-06-05 NOTE — PROGRESS NOTES
Kalani Nelson is a 52 y.o. female and presents with Diabetes (Follow-up)    DM: A1C was 6.1 last visit, she has been on Ozempic 1 mg, no weight loss. She says she has been checking her glucose twice a week, last time 96, typically less than 100. She says she is better with portion control, not drinking sugary drinks. She says she's doing Vera on Mondays and HIIT on Wednesdays and walking the rest of the days. She started doing this 4 weeks ago. A1C today is 6.1  She will be due for eye exam soon, she goes to [facility]     HTN is well controlled. She has MAIDA using her CPAP with no problems, nut she says that sometimes she doesn't wear it some times and she feels she can sleep better without the CPAP, but she uses it most of the times. She says she does not have any energy, she says when she tries to stand up an walk she feels her legs feels very heavy   Last time she sw pulmonary [facility] was a year ago. Asthma: she's asymptomatic. No problems exercising, using the advair every day. Migraines: controlled, no frequent episode. She's concerned about having recurrent bumps with purulent secretion or bleeding in the perineal region and buttocks, painful, has one every week. She has chronic low back pain, she says she never received a call from PT. Review of Systems  Review of Systems   Constitutional: Negative. HENT: Negative. Respiratory: Negative. Cardiovascular: Negative. Gastrointestinal: Negative. Endocrine: Negative. Genitourinary: Negative. Musculoskeletal: Negative. Skin: Negative. Neurological: Negative. Psychiatric/Behavioral: Negative. All other systems reviewed and are negative.        Past Medical History:   Diagnosis Date    Asthma     Chronic pain     Contact dermatitis and eczema due to cause     Diabetes ([address]     Headache     Hypertension     Obesity     S/P hysterectomy     May

## 2023-06-15 PROBLEM — G43.909 MIGRAINE: Status: ACTIVE | Noted: 2020-08-04

## 2023-07-14 ENCOUNTER — OFFICE VISIT (OUTPATIENT)
Age: 48
End: 2023-07-14

## 2023-07-14 VITALS
WEIGHT: 293 LBS | SYSTOLIC BLOOD PRESSURE: 177 MMHG | HEIGHT: 68 IN | HEART RATE: 88 BPM | BODY MASS INDEX: 44.41 KG/M2 | RESPIRATION RATE: 18 BRPM | DIASTOLIC BLOOD PRESSURE: 89 MMHG | OXYGEN SATURATION: 97 %

## 2023-07-14 DIAGNOSIS — Z00.00 ANNUAL VISIT FOR GENERAL ADULT MEDICAL EXAMINATION WITHOUT ABNORMAL FINDINGS: ICD-10-CM

## 2023-07-14 DIAGNOSIS — Z90.711 SCREENING FOR MALIGNANT NEOPLASM OF VAGINA AFTER PARTIAL HYSTERECTOMY: Primary | ICD-10-CM

## 2023-07-14 DIAGNOSIS — Z12.72 SCREENING FOR MALIGNANT NEOPLASM OF VAGINA AFTER PARTIAL HYSTERECTOMY: Primary | ICD-10-CM

## 2023-07-14 NOTE — PROGRESS NOTES
Jair Mcneal is a 52 y.o. female who presents today for the following:  Chief Complaint   Patient presents with    Annual Exam        Allergies   Allergen Reactions    Lisinopril Angioedema    Penicillins Hives       Current Outpatient Medications   Medication Sig Dispense Refill    atorvastatin (LIPITOR) 10 MG tablet       cefdinir (OMNICEF) 300 MG capsule cefdinir 300 mg capsule   Take 1 capsule every 12 hours by oral route as directed. diclofenac sodium (VOLTAREN) 1 % GEL diclofenac 1 % topical gel      metFORMIN (GLUCOPHAGE) 1000 MG tablet metformin 1,000 mg tablet      lisinopril (PRINIVIL;ZESTRIL) 5 MG tablet lisinopril 5 mg tablet      metoclopramide (REGLAN) 5 MG tablet metoclopramide 5 mg tablet      tolterodine (DETROL) 2 MG tablet tolterodine 2 mg tablet      triamcinolone (KENALOG) 0.1 % ointment triamcinolone acetonide 0.1 % topical ointment      Semaglutide, 1 MG/DOSE, (OZEMPIC, 1 MG/DOSE,) 4 MG/3ML SOPN Inject 1 mg into the skin every 7 days 9 mL 3    loratadine (CLARITIN) 10 MG tablet Take 1 tablet by mouth daily 90 tablet 3    Lancets MISC Use to check glucose once a day in the morning before breakfast      albuterol sulfate HFA (PROVENTIL;VENTOLIN;PROAIR) 108 (90 Base) MCG/ACT inhaler Inhale 1 puff into the lungs every 6 hours as needed      amLODIPine (NORVASC) 5 MG tablet Take 1 tablet by mouth daily      aspirin-acetaminophen-caffeine (EXCEDRIN MIGRAINE) 250-250-65 MG per tablet Take 1 tablet by mouth every 8 hours as needed      budesonide-formoterol (SYMBICORT) 160-4.5 MCG/ACT AERO Inhale 2 puffs into the lungs 2 times daily      fluticasone-salmeterol (ADVAIR) 100-50 MCG/ACT AEPB diskus inhaler Inhale 1 puff into the lungs in the morning and 1 puff in the evening.      montelukast (SINGULAIR) 10 MG tablet Take 1 tablet by mouth daily 30 tablet 11    SUMAtriptan (IMITREX) 100 MG tablet Take once for headache, can repeat dose after 2 hours if needed.        No current

## 2023-07-18 LAB
CYTOLOGIST CVX/VAG CYTO: NORMAL
CYTOLOGY CVX/VAG DOC CYTO: NORMAL
CYTOLOGY CVX/VAG DOC THIN PREP: NORMAL
DX ICD CODE: NORMAL
HPV GENOTYPE REFLEX: NORMAL
HPV I/H RISK 4 DNA CVX QL PROBE+SIG AMP: NEGATIVE
Lab: NORMAL
OTHER STN SPEC: NORMAL
STAT OF ADQ CVX/VAG CYTO-IMP: NORMAL

## 2023-09-06 ENCOUNTER — OFFICE VISIT (OUTPATIENT)
Facility: CLINIC | Age: 48
End: 2023-09-06
Payer: COMMERCIAL

## 2023-09-06 VITALS
BODY MASS INDEX: 44.41 KG/M2 | OXYGEN SATURATION: 98 % | SYSTOLIC BLOOD PRESSURE: 127 MMHG | WEIGHT: 293 LBS | HEIGHT: 68 IN | TEMPERATURE: 98.2 F | HEART RATE: 62 BPM | DIASTOLIC BLOOD PRESSURE: 84 MMHG

## 2023-09-06 DIAGNOSIS — R19.00 RIGHT FLANK MASS: ICD-10-CM

## 2023-09-06 DIAGNOSIS — R10.9 RIGHT FLANK PAIN: ICD-10-CM

## 2023-09-06 DIAGNOSIS — E11.9 TYPE 2 DIABETES MELLITUS WITHOUT COMPLICATION, WITHOUT LONG-TERM CURRENT USE OF INSULIN (HCC): ICD-10-CM

## 2023-09-06 DIAGNOSIS — G89.29 CHRONIC RIGHT-SIDED LOW BACK PAIN WITHOUT SCIATICA: Primary | ICD-10-CM

## 2023-09-06 DIAGNOSIS — R53.83 OTHER FATIGUE: ICD-10-CM

## 2023-09-06 DIAGNOSIS — M54.50 CHRONIC RIGHT-SIDED LOW BACK PAIN WITHOUT SCIATICA: Primary | ICD-10-CM

## 2023-09-06 DIAGNOSIS — R10.819 RIGHT FLANK TENDERNESS: ICD-10-CM

## 2023-09-06 PROCEDURE — 3044F HG A1C LEVEL LT 7.0%: CPT | Performed by: INTERNAL MEDICINE

## 2023-09-06 PROCEDURE — 99214 OFFICE O/P EST MOD 30 MIN: CPT | Performed by: INTERNAL MEDICINE

## 2023-09-06 PROCEDURE — 3078F DIAST BP <80 MM HG: CPT | Performed by: INTERNAL MEDICINE

## 2023-09-06 PROCEDURE — 3074F SYST BP LT 130 MM HG: CPT | Performed by: INTERNAL MEDICINE

## 2023-09-06 ASSESSMENT — ENCOUNTER SYMPTOMS
GASTROINTESTINAL NEGATIVE: 1
RESPIRATORY NEGATIVE: 1

## 2023-09-22 ENCOUNTER — TELEPHONE (OUTPATIENT)
Facility: CLINIC | Age: 48
End: 2023-09-22

## 2023-09-22 ENCOUNTER — HOSPITAL ENCOUNTER (OUTPATIENT)
Facility: HOSPITAL | Age: 48
Discharge: HOME | End: 2023-09-22
Attending: INTERNAL MEDICINE
Payer: COMMERCIAL

## 2023-09-22 DIAGNOSIS — R19.00 RIGHT FLANK MASS: ICD-10-CM

## 2023-09-22 DIAGNOSIS — R10.819 RIGHT FLANK TENDERNESS: ICD-10-CM

## 2023-09-22 DIAGNOSIS — G89.29 CHRONIC RIGHT-SIDED LOW BACK PAIN WITHOUT SCIATICA: ICD-10-CM

## 2023-09-22 DIAGNOSIS — R10.9 RIGHT FLANK PAIN: ICD-10-CM

## 2023-09-22 DIAGNOSIS — M54.50 CHRONIC RIGHT-SIDED LOW BACK PAIN WITHOUT SCIATICA: ICD-10-CM

## 2023-09-22 DIAGNOSIS — M54.50 CHRONIC RIGHT-SIDED LOW BACK PAIN WITHOUT SCIATICA: Primary | ICD-10-CM

## 2023-09-22 DIAGNOSIS — G89.29 CHRONIC RIGHT-SIDED LOW BACK PAIN WITHOUT SCIATICA: Primary | ICD-10-CM

## 2023-09-22 LAB — CREAT BLD-MCNC: 0.9 MG/DL (ref 0.6–1.3)

## 2023-09-22 PROCEDURE — 6360000004 HC RX CONTRAST MEDICATION: Performed by: INTERNAL MEDICINE

## 2023-09-22 PROCEDURE — 74160 CT ABDOMEN W/CONTRAST: CPT

## 2023-09-22 PROCEDURE — 82565 ASSAY OF CREATININE: CPT

## 2023-09-22 RX ADMIN — IOPAMIDOL 100 ML: 755 INJECTION, SOLUTION INTRAVENOUS at 10:41

## 2023-09-22 NOTE — TELEPHONE ENCOUNTER
Ct called to say with pt pain in flank and back she should be having ct scan with contrast abd and pelvis.  They want a new order to read abd/pelvis with contrast. Thank you

## 2023-09-28 ENCOUNTER — TELEPHONE (OUTPATIENT)
Facility: CLINIC | Age: 48
End: 2023-09-28

## 2023-09-28 DIAGNOSIS — M54.50 CHRONIC RIGHT-SIDED LOW BACK PAIN WITHOUT SCIATICA: Primary | ICD-10-CM

## 2023-09-28 DIAGNOSIS — G89.29 CHRONIC RIGHT-SIDED LOW BACK PAIN WITHOUT SCIATICA: Primary | ICD-10-CM

## 2023-09-28 NOTE — TELEPHONE ENCOUNTER
Central scheduling called. They say pt is calling to schedule ct pelvis with contrast. I informed them I had thought pt already had that included in her ct scan on 09/22/2023. I will ask Dr. Gabriele Peralta to check the results and let pt know if she still needs further imaging.  Thank you

## 2023-10-02 NOTE — TELEPHONE ENCOUNTER
Called and spoke with pt Informed her per Dr. Maribel Marshall she doesn't need any additional imaging.  Pt says she prefers to go to [facility] for the ortho referral. Thank you

## 2023-12-28 ENCOUNTER — OFFICE VISIT (OUTPATIENT)
Facility: CLINIC | Age: 48
End: 2023-12-28
Payer: COMMERCIAL

## 2023-12-28 VITALS
BODY MASS INDEX: 44.41 KG/M2 | OXYGEN SATURATION: 97 % | HEIGHT: 68 IN | SYSTOLIC BLOOD PRESSURE: 124 MMHG | DIASTOLIC BLOOD PRESSURE: 82 MMHG | RESPIRATION RATE: 18 BRPM | TEMPERATURE: 97.9 F | WEIGHT: 293 LBS

## 2023-12-28 DIAGNOSIS — J30.9 ALLERGIC RHINITIS, UNSPECIFIED SEASONALITY, UNSPECIFIED TRIGGER: ICD-10-CM

## 2023-12-28 DIAGNOSIS — E11.9 TYPE 2 DIABETES MELLITUS WITHOUT COMPLICATION, WITHOUT LONG-TERM CURRENT USE OF INSULIN (HCC): ICD-10-CM

## 2023-12-28 DIAGNOSIS — J45.20 MILD INTERMITTENT ASTHMA WITHOUT COMPLICATION: ICD-10-CM

## 2023-12-28 DIAGNOSIS — E11.9 TYPE 2 DIABETES MELLITUS WITHOUT COMPLICATION, WITHOUT LONG-TERM CURRENT USE OF INSULIN (HCC): Primary | ICD-10-CM

## 2023-12-28 DIAGNOSIS — I10 ESSENTIAL HYPERTENSION: ICD-10-CM

## 2023-12-28 LAB — HBA1C MFR BLD: 6 %

## 2023-12-28 PROCEDURE — 83036 HEMOGLOBIN GLYCOSYLATED A1C: CPT | Performed by: STUDENT IN AN ORGANIZED HEALTH CARE EDUCATION/TRAINING PROGRAM

## 2023-12-28 PROCEDURE — 3079F DIAST BP 80-89 MM HG: CPT | Performed by: STUDENT IN AN ORGANIZED HEALTH CARE EDUCATION/TRAINING PROGRAM

## 2023-12-28 PROCEDURE — 99214 OFFICE O/P EST MOD 30 MIN: CPT | Performed by: STUDENT IN AN ORGANIZED HEALTH CARE EDUCATION/TRAINING PROGRAM

## 2023-12-28 PROCEDURE — 3044F HG A1C LEVEL LT 7.0%: CPT | Performed by: STUDENT IN AN ORGANIZED HEALTH CARE EDUCATION/TRAINING PROGRAM

## 2023-12-28 PROCEDURE — 3074F SYST BP LT 130 MM HG: CPT | Performed by: STUDENT IN AN ORGANIZED HEALTH CARE EDUCATION/TRAINING PROGRAM

## 2023-12-28 RX ORDER — SEMAGLUTIDE 2.68 MG/ML
2 INJECTION, SOLUTION SUBCUTANEOUS
Qty: 3 ML | Refills: 2 | Status: SHIPPED | OUTPATIENT
Start: 2023-12-28 | End: 2024-06-25

## 2023-12-28 RX ORDER — FLUTICASONE PROPIONATE 50 MCG
1 SPRAY, SUSPENSION (ML) NASAL DAILY PRN
Qty: 32 G | Refills: 1 | Status: SHIPPED | OUTPATIENT
Start: 2023-12-28

## 2023-12-28 RX ORDER — METHOCARBAMOL 500 MG/1
500 TABLET, FILM COATED ORAL 3 TIMES DAILY PRN
COMMUNITY
Start: 2023-12-08

## 2023-12-28 NOTE — PROGRESS NOTES
Juventino Estrada (: 1975) is a 50 y.o. female, Established patient patient, here for evaluation of the following chief complaint(s):  Follow-up, Hypertension, and Diabetes       ASSESSMENT/PLAN:  Below is the assessment and plan developed based on review of pertinent history, physical exam, labs, studies, and medications. 1. Type 2 diabetes mellitus without complication, without long-term current use of insulin (HCC)  -     AMB POC HEMOGLOBIN A1C  -     Semaglutide, 2 MG/DOSE, (OZEMPIC, 2 MG/DOSE,) 8 MG/3ML SOPN; Inject 2 mg into the skin every 7 days, Disp-3 mL, R-2Normal  -     Basic Metabolic Panel; Future  -     Lipid Panel; Future  2. Essential hypertension  3. Body mass index (BMI) 50.0-59.9, adult ([facility])  -     Semaglutide, 2 MG/DOSE, (OZEMPIC, 2 MG/DOSE,) 8 MG/3ML SOPN; Inject 2 mg into the skin every 7 days, Disp-3 mL, R-2Normal  4. Mild intermittent asthma without complication  5. Allergic rhinitis, unspecified seasonality, unspecified trigger  -     fluticasone (FLONASE) 50 MCG/ACT nasal spray; 1 spray by Each Nostril route daily as needed for Rhinitis, Disp-32 g, R-1Normal    PCP: Dr. Kimberly Fagan, first visit with me. Diabetes: HbA1c 6.0, controlled. Will go up on Ozempic to 2 mg which will help with weight loss. She she has made dietary changes. Counseled on exercise. She follows with ophthalmology, utd with eye exam.     Hypertension: Diet controlled, monitor. Blood work as above. Return in about 3 months (around 3/28/2024) for follow up. SUBJECTIVE/OBJECTIVE:  TOMASA Estrada is a 50 yr old who presents to the clinic for a follow up. Patient of Dr. Kimberly Fagan, first visit with me. She has a history of diabetes, currently on Ozempic 1 mg. She has changed her diet to baked, grilled, more leans meats, vegetables. She does marizol occasionally. She says she used to eat a lot of sweets which she has cut down.  She states she has been eating more quantity with the holidays

## 2024-03-15 ENCOUNTER — TELEPHONE (OUTPATIENT)
Facility: CLINIC | Age: 49
End: 2024-03-15

## 2024-03-15 NOTE — TELEPHONE ENCOUNTER
FMLA paperwork scanned into patient's chart to be completed.      Paperwork put in folder for provider

## 2024-03-18 ENCOUNTER — HOSPITAL ENCOUNTER (OUTPATIENT)
Facility: HOSPITAL | Age: 49
Discharge: HOME | End: 2024-03-21
Payer: COMMERCIAL

## 2024-03-18 DIAGNOSIS — M54.16 LUMBAR RADICULOPATHY: ICD-10-CM

## 2024-03-18 DIAGNOSIS — M47.816 LUMBAR SPONDYLOSIS: ICD-10-CM

## 2024-03-18 PROCEDURE — 72148 MRI LUMBAR SPINE W/O DYE: CPT

## 2024-03-18 NOTE — TELEPHONE ENCOUNTER
She stated that she needs FMLA for Asthma, DM, Migraines. She states that its to be able to go to her appointments and for when she has episodes. She states that she does see the pul[facility]ologist [facility]. She does have a neurologist that she sees Dr. Duckworth in [place]  she did follow up a year go with the neurologist. She states that Dr. Gonsales was treating her for her Asthma, DM, and Migraines. She stated that she only saw the pulmonogist to get her CPAP.

## 2024-03-21 NOTE — TELEPHONE ENCOUNTER
I called and spoke with the patient she stated that she is still getting PT. She had done 8 sessions and does 1 session a week. She has been doing PT since January. She stated that she is scheduled to do 14 more sessions of PT. She is going to [facility] and she said that she had an MRI done recently. For her Asthma she says that she doesn't get flare ups often but when she does it it usually 1 or 2 times a month and this occurs when her allergies act up. She states that she is using her proair inhaler and is taking Singular and loratadine. For Migraines she says that this happens once a month.

## 2024-03-26 NOTE — TELEPHONE ENCOUNTER
Pt called to let her know that I need the correct form. Correct form e-mailed and given to you to fill out.

## 2024-03-29 LAB
BUN SERPL-MCNC: 12 MG/DL (ref 6–24)
BUN/CREAT SERPL: 15 (ref 9–23)
CALCIUM SERPL-MCNC: 9 MG/DL (ref 8.7–10.2)
CHLORIDE SERPL-SCNC: 105 MMOL/L (ref 96–106)
CHOLEST SERPL-MCNC: 176 MG/DL (ref 100–199)
CO2 SERPL-SCNC: 23 MMOL/L (ref 20–29)
CREAT SERPL-MCNC: 0.81 MG/DL (ref 0.57–1)
EGFRCR SERPLBLD CKD-EPI 2021: 89 ML/MIN/1.73
GLUCOSE SERPL-MCNC: 92 MG/DL (ref 70–99)
HDLC SERPL-MCNC: 56 MG/DL
LDLC SERPL CALC-MCNC: 105 MG/DL (ref 0–99)
POTASSIUM SERPL-SCNC: 4.9 MMOL/L (ref 3.5–5.2)
SODIUM SERPL-SCNC: 143 MMOL/L (ref 134–144)
TRIGL SERPL-MCNC: 79 MG/DL (ref 0–149)
VLDLC SERPL CALC-MCNC: 15 MG/DL (ref 5–40)

## 2024-04-03 DIAGNOSIS — E11.9 TYPE 2 DIABETES MELLITUS WITHOUT COMPLICATION, WITHOUT LONG-TERM CURRENT USE OF INSULIN (HCC): ICD-10-CM

## 2024-04-05 RX ORDER — SEMAGLUTIDE 2.68 MG/ML
INJECTION, SOLUTION SUBCUTANEOUS
Qty: 3 ML | Refills: 2 | Status: SHIPPED | OUTPATIENT
Start: 2024-04-05

## 2024-04-08 ENCOUNTER — OFFICE VISIT (OUTPATIENT)
Facility: CLINIC | Age: 49
End: 2024-04-08
Payer: COMMERCIAL

## 2024-04-08 VITALS
DIASTOLIC BLOOD PRESSURE: 96 MMHG | RESPIRATION RATE: 18 BRPM | WEIGHT: 293 LBS | BODY MASS INDEX: 44.41 KG/M2 | HEART RATE: 85 BPM | TEMPERATURE: 98.1 F | SYSTOLIC BLOOD PRESSURE: 144 MMHG | OXYGEN SATURATION: 98 % | HEIGHT: 68 IN

## 2024-04-08 DIAGNOSIS — Z12.31 ENCOUNTER FOR SCREENING MAMMOGRAM FOR MALIGNANT NEOPLASM OF BREAST: ICD-10-CM

## 2024-04-08 DIAGNOSIS — E11.9 TYPE 2 DIABETES MELLITUS WITHOUT COMPLICATION, WITHOUT LONG-TERM CURRENT USE OF INSULIN (HCC): ICD-10-CM

## 2024-04-08 DIAGNOSIS — I10 ESSENTIAL HYPERTENSION: Primary | ICD-10-CM

## 2024-04-08 DIAGNOSIS — L30.9 ECZEMA, UNSPECIFIED TYPE: ICD-10-CM

## 2024-04-08 DIAGNOSIS — G43.809 OTHER MIGRAINE WITHOUT STATUS MIGRAINOSUS, NOT INTRACTABLE: ICD-10-CM

## 2024-04-08 DIAGNOSIS — J45.909 UNCOMPLICATED ASTHMA, UNSPECIFIED ASTHMA SEVERITY, UNSPECIFIED WHETHER PERSISTENT: ICD-10-CM

## 2024-04-08 LAB — HBA1C MFR BLD: 5.8 %

## 2024-04-08 PROCEDURE — 99214 OFFICE O/P EST MOD 30 MIN: CPT | Performed by: STUDENT IN AN ORGANIZED HEALTH CARE EDUCATION/TRAINING PROGRAM

## 2024-04-08 PROCEDURE — 3080F DIAST BP >= 90 MM HG: CPT | Performed by: STUDENT IN AN ORGANIZED HEALTH CARE EDUCATION/TRAINING PROGRAM

## 2024-04-08 PROCEDURE — 83036 HEMOGLOBIN GLYCOSYLATED A1C: CPT | Performed by: STUDENT IN AN ORGANIZED HEALTH CARE EDUCATION/TRAINING PROGRAM

## 2024-04-08 PROCEDURE — 3077F SYST BP >= 140 MM HG: CPT | Performed by: STUDENT IN AN ORGANIZED HEALTH CARE EDUCATION/TRAINING PROGRAM

## 2024-04-08 RX ORDER — MONTELUKAST SODIUM 10 MG/1
10 TABLET ORAL DAILY
COMMUNITY
Start: 2024-02-25

## 2024-04-08 RX ORDER — TRIAMCINOLONE ACETONIDE 0.25 MG/G
CREAM TOPICAL
Qty: 80 G | Refills: 1 | Status: SHIPPED | OUTPATIENT
Start: 2024-04-08

## 2024-04-08 RX ORDER — GABAPENTIN 300 MG/1
300 CAPSULE ORAL NIGHTLY
COMMUNITY
Start: 2024-03-05

## 2024-04-08 RX ORDER — LORATADINE 10 MG/1
10 TABLET ORAL
COMMUNITY

## 2024-04-08 ASSESSMENT — PATIENT HEALTH QUESTIONNAIRE - PHQ9
SUM OF ALL RESPONSES TO PHQ9 QUESTIONS 1 & 2: 0
3. TROUBLE FALLING OR STAYING ASLEEP: NOT AT ALL
10. IF YOU CHECKED OFF ANY PROBLEMS, HOW DIFFICULT HAVE THESE PROBLEMS MADE IT FOR YOU TO DO YOUR WORK, TAKE CARE OF THINGS AT HOME, OR GET ALONG WITH OTHER PEOPLE: NOT DIFFICULT AT ALL
1. LITTLE INTEREST OR PLEASURE IN DOING THINGS: NOT AT ALL
4. FEELING TIRED OR HAVING LITTLE ENERGY: NOT AT ALL
2. FEELING DOWN, DEPRESSED OR HOPELESS: NOT AT ALL
SUM OF ALL RESPONSES TO PHQ QUESTIONS 1-9: 0
9. THOUGHTS THAT YOU WOULD BE BETTER OFF DEAD, OR OF HURTING YOURSELF: NOT AT ALL
7. TROUBLE CONCENTRATING ON THINGS, SUCH AS READING THE NEWSPAPER OR WATCHING TELEVISION: NOT AT ALL
SUM OF ALL RESPONSES TO PHQ QUESTIONS 1-9: 0
6. FEELING BAD ABOUT YOURSELF - OR THAT YOU ARE A FAILURE OR HAVE LET YOURSELF OR YOUR FAMILY DOWN: NOT AT ALL
8. MOVING OR SPEAKING SO SLOWLY THAT OTHER PEOPLE COULD HAVE NOTICED. OR THE OPPOSITE, BEING SO FIGETY OR RESTLESS THAT YOU HAVE BEEN MOVING AROUND A LOT MORE THAN USUAL: NOT AT ALL
SUM OF ALL RESPONSES TO PHQ QUESTIONS 1-9: 0
5. POOR APPETITE OR OVEREATING: NOT AT ALL
SUM OF ALL RESPONSES TO PHQ QUESTIONS 1-9: 0

## 2024-04-08 NOTE — PROGRESS NOTES
\"Have you been to the ER, urgent care clinic since your last visit?  Hospitalized since your last visit?\"    NO    “Have you seen or consulted any other health care providers outside of [facility] since your last visit?”    YES - When: approximately 1 months ago.  Where and Why: [facility] for a follow up .      Chief Complaint   Patient presents with    Follow-up Chronic Condition     BP (!) 156/94 (Site: Right Lower Arm, Position: Sitting, Cuff Size: Large Adult)   Pulse 85   Temp 98.1 °F (36.7 °C) (Oral)   Resp 18   Ht 1.727 m (5' 8\")   Wt (!) 156.7 kg (345 lb 6.4 oz)   SpO2 98%   BMI 52.52 kg/m²               Click Here for Release of Records Request

## 2024-04-08 NOTE — PROGRESS NOTES
Luisa Smyth (: 1975) is a 48 y.o. female, Established patient patient, here for evaluation of the following chief complaint(s):  Follow-up Chronic Condition       ASSESSMENT/PLAN:  Below is the assessment and plan developed based on review of pertinent history, physical exam, labs, studies, and medications.    1. Essential hypertension  2. Encounter for screening mammogram for malignant neoplasm of breast  -     UDAY DIGITAL SCREEN W OR WO CAD BILATERAL; Future  3. Eczema, unspecified type  -     triamcinolone (KENALOG) 0.025 % cream; Apply topically 2 times daily as need eczema, Disp-80 g, R-1, Normal  4. Type 2 diabetes mellitus without complication, without long-term current use of insulin ([facility])  -     AMB POC HEMOGLOBIN A1C  5. Other migraine without status migrainosus, not intractable  6. Uncomplicated asthma, unspecified asthma severity, unspecified whether persistent    Blood pressure slight high in the clinic today, monitor.  Currently not on any antihypertensives, if persistently high, we will have to restart.    Diabetes: Controlled, continue current medications.  Results for orders placed or performed in visit on 24   AMB POC HEMOGLOBIN A1C   Result Value Ref Range    Hemoglobin A1C, POC 5.8 %         No follow-ups on file.         SUBJECTIVE/OBJECTIVE:  TOMASA Smyth is a 48 yr old who presents for a follow up.   She has diabetes, hypertension, asthma.   She states she missed one dose of ozempic, currently on 2 mg dose.   She was having right-sided lower back pain, she was diagnosed with sciatica. She is following with Ortho. Currently going for pt  She has asthma, controlled, uses albuterol as needed.  Migraines: on sumatriptan, excedrin as need     Allergies   Allergen Reactions    Lisinopril Angioedema    Penicillins Hives     Current Outpatient Medications   Medication Sig Dispense Refill    gabapentin (NEURONTIN) 300 MG capsule Take 1 capsule by mouth nightly.

## 2024-05-31 NOTE — TELEPHONE ENCOUNTER
Pt states that the metformin is causing her to have very bad loose stools and fatigue what should she do about this? Reason for call:   [x] Refill   [] Prior Auth  [] Other:     Office:   [x] PCP/Provider -   [] Specialty/Provider -     Medication:   Albuterol 90mcg inhaler- inhale 2 puffs every 6 hours as needed  Flovent 44mcg inhaler- inhale 2 puffs 2 times a day    Pharmacy: [facility] [place]    Does the patient have enough for 3 days?   [] Yes   [x] No - Send as HP to POD

## 2024-07-11 ENCOUNTER — TELEPHONE (OUTPATIENT)
Facility: CLINIC | Age: 49
End: 2024-07-11

## 2024-07-11 ENCOUNTER — NURSE ONLY (OUTPATIENT)
Facility: CLINIC | Age: 49
End: 2024-07-11
Payer: COMMERCIAL

## 2024-07-11 DIAGNOSIS — R82.90 ABNORMAL URINE ODOR: Primary | ICD-10-CM

## 2024-07-11 DIAGNOSIS — E11.9 TYPE 2 DIABETES MELLITUS WITHOUT COMPLICATION, WITHOUT LONG-TERM CURRENT USE OF INSULIN (HCC): ICD-10-CM

## 2024-07-11 DIAGNOSIS — R82.90 CLOUDY URINE: ICD-10-CM

## 2024-07-11 LAB
BILIRUBIN, URINE, POC: NEGATIVE
BLOOD URINE, POC: NEGATIVE
GLUCOSE URINE, POC: NEGATIVE
KETONES, URINE, POC: ABNORMAL
LEUKOCYTE ESTERASE, URINE, POC: ABNORMAL
NITRITE, URINE, POC: NEGATIVE
PH, URINE, POC: 6 (ref 4.6–8)
PROTEIN,URINE, POC: ABNORMAL
SPECIFIC GRAVITY, URINE, POC: 1.03 (ref 1–1.03)
URINALYSIS CLARITY, POC: ABNORMAL
URINALYSIS COLOR, POC: ABNORMAL
UROBILINOGEN, POC: NORMAL

## 2024-07-11 PROCEDURE — 81003 URINALYSIS AUTO W/O SCOPE: CPT | Performed by: STUDENT IN AN ORGANIZED HEALTH CARE EDUCATION/TRAINING PROGRAM

## 2024-07-11 RX ORDER — SEMAGLUTIDE 2.68 MG/ML
INJECTION, SOLUTION SUBCUTANEOUS
Qty: 3 ML | Refills: 2 | Status: CANCELLED | OUTPATIENT
Start: 2024-07-11

## 2024-07-11 RX ORDER — SEMAGLUTIDE 2.68 MG/ML
INJECTION, SOLUTION SUBCUTANEOUS
Qty: 9 ML | Refills: 1 | Status: SHIPPED | OUTPATIENT
Start: 2024-07-11

## 2024-07-11 NOTE — TELEPHONE ENCOUNTER
Patient called requesting a refill for the following medication:      OZEMPIC, 2 MG/DOSE, 8 MG/3ML SOPN sc injection             Please send to [facility] on [address], [place]

## 2024-07-11 NOTE — TELEPHONE ENCOUNTER
Took call from patient, c/o urine odor and cloudy urine. Advised to come by the office for urine check.

## 2024-07-12 DIAGNOSIS — N30.90 CYSTITIS: Primary | ICD-10-CM

## 2024-07-12 RX ORDER — NITROFURANTOIN 25; 75 MG/1; MG/1
100 CAPSULE ORAL 2 TIMES DAILY
Qty: 10 CAPSULE | Refills: 0 | Status: SHIPPED | OUTPATIENT
Start: 2024-07-12 | End: 2024-07-17

## 2024-07-18 LAB
BACTERIA UR CULT: ABNORMAL
BACTERIA UR CULT: ABNORMAL

## 2024-08-05 ENCOUNTER — OFFICE VISIT (OUTPATIENT)
Facility: CLINIC | Age: 49
End: 2024-08-05
Payer: COMMERCIAL

## 2024-08-05 VITALS
OXYGEN SATURATION: 94 % | TEMPERATURE: 98.7 F | DIASTOLIC BLOOD PRESSURE: 87 MMHG | HEART RATE: 74 BPM | SYSTOLIC BLOOD PRESSURE: 138 MMHG | BODY MASS INDEX: 44.41 KG/M2 | HEIGHT: 68 IN | WEIGHT: 293 LBS | RESPIRATION RATE: 18 BRPM

## 2024-08-05 DIAGNOSIS — R01.1 HEART MURMUR: ICD-10-CM

## 2024-08-05 DIAGNOSIS — E11.9 TYPE 2 DIABETES MELLITUS WITHOUT COMPLICATION, WITHOUT LONG-TERM CURRENT USE OF INSULIN (HCC): Primary | ICD-10-CM

## 2024-08-05 DIAGNOSIS — E55.9 VITAMIN D DEFICIENCY: ICD-10-CM

## 2024-08-05 DIAGNOSIS — I10 ESSENTIAL HYPERTENSION: ICD-10-CM

## 2024-08-05 DIAGNOSIS — G43.809 OTHER MIGRAINE WITHOUT STATUS MIGRAINOSUS, NOT INTRACTABLE: ICD-10-CM

## 2024-08-05 DIAGNOSIS — R07.9 CHEST PAIN, UNSPECIFIED TYPE: ICD-10-CM

## 2024-08-05 DIAGNOSIS — E11.9 TYPE 2 DIABETES MELLITUS WITHOUT COMPLICATION, WITHOUT LONG-TERM CURRENT USE OF INSULIN (HCC): ICD-10-CM

## 2024-08-05 DIAGNOSIS — E53.8 VITAMIN B12 DEFICIENCY: ICD-10-CM

## 2024-08-05 DIAGNOSIS — J30.9 ALLERGIC RHINITIS, UNSPECIFIED SEASONALITY, UNSPECIFIED TRIGGER: ICD-10-CM

## 2024-08-05 DIAGNOSIS — R82.90 FOUL SMELLING URINE: ICD-10-CM

## 2024-08-05 DIAGNOSIS — G47.30 SLEEP APNEA, UNSPECIFIED TYPE: ICD-10-CM

## 2024-08-05 DIAGNOSIS — F41.9 ANXIETY: ICD-10-CM

## 2024-08-05 DIAGNOSIS — J45.40 MODERATE PERSISTENT ASTHMA WITHOUT COMPLICATION: ICD-10-CM

## 2024-08-05 LAB — HBA1C MFR BLD: 5.7 %

## 2024-08-05 PROCEDURE — 83036 HEMOGLOBIN GLYCOSYLATED A1C: CPT | Performed by: STUDENT IN AN ORGANIZED HEALTH CARE EDUCATION/TRAINING PROGRAM

## 2024-08-05 PROCEDURE — 3075F SYST BP GE 130 - 139MM HG: CPT | Performed by: STUDENT IN AN ORGANIZED HEALTH CARE EDUCATION/TRAINING PROGRAM

## 2024-08-05 PROCEDURE — 3079F DIAST BP 80-89 MM HG: CPT | Performed by: STUDENT IN AN ORGANIZED HEALTH CARE EDUCATION/TRAINING PROGRAM

## 2024-08-05 PROCEDURE — 99214 OFFICE O/P EST MOD 30 MIN: CPT | Performed by: STUDENT IN AN ORGANIZED HEALTH CARE EDUCATION/TRAINING PROGRAM

## 2024-08-05 RX ORDER — LORATADINE 10 MG/1
10 TABLET ORAL
Qty: 90 TABLET | Refills: 3 | Status: SHIPPED | OUTPATIENT
Start: 2024-08-05

## 2024-08-05 RX ORDER — SEMAGLUTIDE 2.68 MG/ML
INJECTION, SOLUTION SUBCUTANEOUS
Qty: 9 ML | Refills: 1 | Status: SHIPPED | OUTPATIENT
Start: 2024-08-05

## 2024-08-05 RX ORDER — MONTELUKAST SODIUM 10 MG/1
10 TABLET ORAL DAILY
Qty: 90 TABLET | Refills: 1 | Status: SHIPPED | OUTPATIENT
Start: 2024-08-05

## 2024-08-05 RX ORDER — TIZANIDINE 4 MG/1
4 TABLET ORAL EVERY 8 HOURS PRN
COMMUNITY
Start: 2024-06-03

## 2024-08-05 RX ORDER — FLUTICASONE PROPIONATE AND SALMETEROL 100; 50 UG/1; UG/1
1 POWDER RESPIRATORY (INHALATION) 2 TIMES DAILY
Qty: 60 EACH | Refills: 2 | Status: SHIPPED | OUTPATIENT
Start: 2024-08-05

## 2024-08-05 RX ORDER — VENLAFAXINE HYDROCHLORIDE 37.5 MG/1
37.5 CAPSULE, EXTENDED RELEASE ORAL DAILY
Qty: 30 CAPSULE | Refills: 2 | Status: SHIPPED | OUTPATIENT
Start: 2024-08-05

## 2024-08-05 RX ORDER — SUMATRIPTAN 100 MG/1
TABLET, FILM COATED ORAL
Qty: 9 TABLET | Refills: 2 | Status: SHIPPED | OUTPATIENT
Start: 2024-08-05

## 2024-08-05 RX ORDER — PANTOPRAZOLE SODIUM 20 MG/1
20 TABLET, DELAYED RELEASE ORAL DAILY
Qty: 30 TABLET | Refills: 2 | Status: SHIPPED | OUTPATIENT
Start: 2024-08-05

## 2024-08-05 SDOH — ECONOMIC STABILITY: FOOD INSECURITY: WITHIN THE PAST 12 MONTHS, YOU WORRIED THAT YOUR FOOD WOULD RUN OUT BEFORE YOU GOT MONEY TO BUY MORE.: NEVER TRUE

## 2024-08-05 SDOH — ECONOMIC STABILITY: FOOD INSECURITY: WITHIN THE PAST 12 MONTHS, THE FOOD YOU BOUGHT JUST DIDN'T LAST AND YOU DIDN'T HAVE MONEY TO GET MORE.: NEVER TRUE

## 2024-08-05 SDOH — ECONOMIC STABILITY: INCOME INSECURITY: HOW HARD IS IT FOR YOU TO PAY FOR THE VERY BASICS LIKE FOOD, HOUSING, MEDICAL CARE, AND HEATING?: NOT HARD AT ALL

## 2024-08-05 ASSESSMENT — ANXIETY QUESTIONNAIRES
7. FEELING AFRAID AS IF SOMETHING AWFUL MIGHT HAPPEN: NEARLY EVERY DAY
6. BECOMING EASILY ANNOYED OR IRRITABLE: NEARLY EVERY DAY
IF YOU CHECKED OFF ANY PROBLEMS ON THIS QUESTIONNAIRE, HOW DIFFICULT HAVE THESE PROBLEMS MADE IT FOR YOU TO DO YOUR WORK, TAKE CARE OF THINGS AT HOME, OR GET ALONG WITH OTHER PEOPLE: SOMEWHAT DIFFICULT
5. BEING SO RESTLESS THAT IT IS HARD TO SIT STILL: NOT AT ALL
1. FEELING NERVOUS, ANXIOUS, OR ON EDGE: MORE THAN HALF THE DAYS
3. WORRYING TOO MUCH ABOUT DIFFERENT THINGS: NEARLY EVERY DAY
4. TROUBLE RELAXING: NOT AT ALL
GAD7 TOTAL SCORE: 14
2. NOT BEING ABLE TO STOP OR CONTROL WORRYING: NEARLY EVERY DAY

## 2024-08-05 ASSESSMENT — ENCOUNTER SYMPTOMS
WHEEZING: 1
SHORTNESS OF BREATH: 1
DIARRHEA: 1

## 2024-08-05 ASSESSMENT — PATIENT HEALTH QUESTIONNAIRE - PHQ9
SUM OF ALL RESPONSES TO PHQ QUESTIONS 1-9: 1
SUM OF ALL RESPONSES TO PHQ QUESTIONS 1-9: 1
SUM OF ALL RESPONSES TO PHQ9 QUESTIONS 1 & 2: 1
2. FEELING DOWN, DEPRESSED OR HOPELESS: NOT AT ALL
1. LITTLE INTEREST OR PLEASURE IN DOING THINGS: SEVERAL DAYS
SUM OF ALL RESPONSES TO PHQ QUESTIONS 1-9: 1
SUM OF ALL RESPONSES TO PHQ QUESTIONS 1-9: 1

## 2024-08-05 NOTE — PROGRESS NOTES
\"Have you been to the ER, urgent care clinic since your last visit?  Hospitalized since your last visit?\"    NO    “Have you seen or consulted any other health care providers outside of [facility] since your last visit?”    YES - When: approximately July 2024 ago.  Where and Why: Orthopedics for a follow up.    Have you had a mammogram?”   NO    Date of last Mammogram: 9/7/2021       Chief Complaint   Patient presents with    Follow-up Chronic Condition     /87 (Site: Left Upper Arm, Position: Sitting, Cuff Size: Medium Adult)   Pulse 74   Temp 98.7 °F (37.1 °C) (Oral)   Resp 18   Ht 1.727 m (5' 8\")   Wt (!) 157 kg (346 lb 3.2 oz)   SpO2 94%   BMI 52.64 kg/m²         Click Here for Release of Records Request   
She is alert. Mental status is at baseline.   Psychiatric:         Mood and Affect: Mood normal.         Behavior: Behavior normal.                 An electronic signature was used to authenticate this note.  -- Surjit Garcia MD

## 2024-08-09 ENCOUNTER — TRANSCRIBE ORDERS (OUTPATIENT)
Facility: HOSPITAL | Age: 49
End: 2024-08-09

## 2024-08-09 DIAGNOSIS — Z12.31 ENCOUNTER FOR MAMMOGRAM TO ESTABLISH BASELINE MAMMOGRAM: Primary | ICD-10-CM

## 2024-08-15 ENCOUNTER — HOSPITAL ENCOUNTER (OUTPATIENT)
Facility: HOSPITAL | Age: 49
Discharge: HOME | End: 2024-08-15
Attending: STUDENT IN AN ORGANIZED HEALTH CARE EDUCATION/TRAINING PROGRAM
Payer: COMMERCIAL

## 2024-08-15 DIAGNOSIS — Z12.31 ENCOUNTER FOR MAMMOGRAM TO ESTABLISH BASELINE MAMMOGRAM: ICD-10-CM

## 2024-08-15 PROCEDURE — 77063 BREAST TOMOSYNTHESIS BI: CPT

## 2024-08-22 ENCOUNTER — HOSPITAL ENCOUNTER (OUTPATIENT)
Facility: HOSPITAL | Age: 49
Discharge: HOME | End: 2024-08-24
Attending: STUDENT IN AN ORGANIZED HEALTH CARE EDUCATION/TRAINING PROGRAM
Payer: COMMERCIAL

## 2024-08-22 DIAGNOSIS — R01.1 HEART MURMUR: ICD-10-CM

## 2024-08-22 PROCEDURE — 93306 TTE W/DOPPLER COMPLETE: CPT

## 2024-08-23 LAB
ECHO AO ASC DIAM: 3.1 CM
ECHO AO ROOT DIAM: 2.6 CM
ECHO AV AREA PEAK VELOCITY: 1.3 CM2
ECHO AV AREA VTI: 1.5 CM2
ECHO AV MEAN GRADIENT: 11 MMHG
ECHO AV MEAN VELOCITY: 1.5 M/S
ECHO AV PEAK GRADIENT: 20 MMHG
ECHO AV PEAK VELOCITY: 2.3 M/S
ECHO AV VELOCITY RATIO: 0.52
ECHO AV VTI: 47.1 CM
ECHO EST RA PRESSURE: 3 MMHG
ECHO IVC PROX: 1.9 CM
ECHO LA AREA 2C: 19.9 CM2
ECHO LA AREA 4C: 15.5 CM2
ECHO LA DIAMETER: 2.7 CM
ECHO LA MAJOR AXIS: 4.8 CM
ECHO LA MINOR AXIS: 5.4 CM
ECHO LA TO AORTIC ROOT RATIO: 1.04
ECHO LA VOL BP: 53 ML (ref 22–52)
ECHO LA VOL MOD A2C: 59 ML (ref 22–52)
ECHO LA VOL MOD A4C: 42 ML (ref 22–52)
ECHO LV E' LATERAL VELOCITY: 13 CM/S
ECHO LV E' SEPTAL VELOCITY: 10 CM/S
ECHO LV EDV A2C: 108 ML
ECHO LV EDV A4C: 98 ML
ECHO LV EJECTION FRACTION A2C: 69 %
ECHO LV EJECTION FRACTION A4C: 59 %
ECHO LV EJECTION FRACTION BIPLANE: 64 % (ref 55–100)
ECHO LV ESV A2C: 34 ML
ECHO LV ESV A4C: 41 ML
ECHO LV FRACTIONAL SHORTENING: 40 % (ref 28–44)
ECHO LV GLOBAL LONGITUDINAL STRAIN (GLS): -19.6 %
ECHO LV GLOBAL LONGITUDINAL STRAIN (GLS): -22 %
ECHO LV GLOBAL LONGITUDINAL STRAIN (GLS): -22.7 %
ECHO LV GLOBAL LONGITUDINAL STRAIN (GLS): -23.6 %
ECHO LV INTERNAL DIMENSION DIASTOLIC: 4.5 CM (ref 3.9–5.3)
ECHO LV INTERNAL DIMENSION SYSTOLIC: 2.7 CM
ECHO LV IVSD: 1 CM (ref 0.6–0.9)
ECHO LV MASS 2D: 175 G (ref 67–162)
ECHO LV POSTERIOR WALL DIASTOLIC: 1.2 CM (ref 0.6–0.9)
ECHO LV RELATIVE WALL THICKNESS RATIO: 0.53
ECHO LVOT AREA: 2.5 CM2
ECHO LVOT AV VTI INDEX: 0.58
ECHO LVOT DIAM: 1.8 CM
ECHO LVOT MEAN GRADIENT: 3 MMHG
ECHO LVOT PEAK GRADIENT: 6 MMHG
ECHO LVOT PEAK VELOCITY: 1.2 M/S
ECHO LVOT SV: 69.4 ML
ECHO LVOT VTI: 27.3 CM
ECHO MV A VELOCITY: 0.83 M/S
ECHO MV AREA VTI: 2 CM2
ECHO MV E DECELERATION TIME (DT): 326 MS
ECHO MV E VELOCITY: 1.01 M/S
ECHO MV E/A RATIO: 1.22
ECHO MV E/E' LATERAL: 7.77
ECHO MV E/E' RATIO (AVERAGED): 8.93
ECHO MV E/E' SEPTAL: 10.1
ECHO MV LVOT VTI INDEX: 1.3
ECHO MV MAX VELOCITY: 1.2 M/S
ECHO MV MEAN GRADIENT: 2 MMHG
ECHO MV MEAN VELOCITY: 0.7 M/S
ECHO MV PEAK GRADIENT: 5 MMHG
ECHO MV VTI: 35.5 CM
ECHO PV MAX VELOCITY: 1.1 M/S
ECHO PV MEAN GRADIENT: 3 MMHG
ECHO PV MEAN VELOCITY: 0.8 M/S
ECHO PV PEAK GRADIENT: 5 MMHG
ECHO PV VTI: 23.8 CM
ECHO RA AREA 4C: 11.4 CM2
ECHO RA VOLUME: 29 ML
ECHO RIGHT VENTRICULAR SYSTOLIC PRESSURE (RVSP): 18 MMHG
ECHO RV BASAL DIMENSION: 4.3 CM
ECHO RV FREE WALL PEAK S': 16 CM/S
ECHO RV LONGITUDINAL DIMENSION: 8.5 CM
ECHO RV MID DIMENSION: 3 CM
ECHO RV TAPSE: 3.3 CM (ref 1.7–?)
ECHO RVOT MEAN GRADIENT: 1 MMHG
ECHO RVOT PEAK GRADIENT: 2 MMHG
ECHO RVOT PEAK VELOCITY: 0.7 M/S
ECHO RVOT VTI: 11.5 CM
ECHO TV REGURGITANT MAX VELOCITY: 1.91 M/S
ECHO TV REGURGITANT PEAK GRADIENT: 15 MMHG

## 2024-11-26 ENCOUNTER — TELEPHONE (OUTPATIENT)
Facility: CLINIC | Age: 49
End: 2024-11-26

## 2024-11-26 DIAGNOSIS — I10 ESSENTIAL HYPERTENSION: Primary | ICD-10-CM

## 2024-11-26 RX ORDER — AMLODIPINE BESYLATE 5 MG/1
5 TABLET ORAL DAILY
Qty: 90 TABLET | Refills: 0 | Status: SHIPPED | OUTPATIENT
Start: 2024-11-26

## 2024-12-09 ENCOUNTER — OFFICE VISIT (OUTPATIENT)
Facility: CLINIC | Age: 49
End: 2024-12-09
Payer: COMMERCIAL

## 2024-12-09 VITALS
HEART RATE: 68 BPM | WEIGHT: 293 LBS | TEMPERATURE: 97.8 F | BODY MASS INDEX: 44.41 KG/M2 | RESPIRATION RATE: 18 BRPM | DIASTOLIC BLOOD PRESSURE: 87 MMHG | OXYGEN SATURATION: 99 % | HEIGHT: 68 IN | SYSTOLIC BLOOD PRESSURE: 134 MMHG

## 2024-12-09 DIAGNOSIS — J45.40 MODERATE PERSISTENT ASTHMA WITHOUT COMPLICATION: ICD-10-CM

## 2024-12-09 DIAGNOSIS — E11.9 TYPE 2 DIABETES MELLITUS WITHOUT COMPLICATION, WITHOUT LONG-TERM CURRENT USE OF INSULIN (HCC): ICD-10-CM

## 2024-12-09 DIAGNOSIS — G43.809 OTHER MIGRAINE WITHOUT STATUS MIGRAINOSUS, NOT INTRACTABLE: ICD-10-CM

## 2024-12-09 DIAGNOSIS — I10 ESSENTIAL HYPERTENSION: ICD-10-CM

## 2024-12-09 DIAGNOSIS — K21.9 GASTROESOPHAGEAL REFLUX DISEASE, UNSPECIFIED WHETHER ESOPHAGITIS PRESENT: ICD-10-CM

## 2024-12-09 DIAGNOSIS — J30.9 ALLERGIC RHINITIS, UNSPECIFIED SEASONALITY, UNSPECIFIED TRIGGER: ICD-10-CM

## 2024-12-09 DIAGNOSIS — I10 ESSENTIAL HYPERTENSION: Primary | ICD-10-CM

## 2024-12-09 DIAGNOSIS — G47.33 OBSTRUCTIVE SLEEP APNEA SYNDROME: ICD-10-CM

## 2024-12-09 DIAGNOSIS — E78.5 HYPERLIPIDEMIA, UNSPECIFIED HYPERLIPIDEMIA TYPE: ICD-10-CM

## 2024-12-09 PROCEDURE — 3075F SYST BP GE 130 - 139MM HG: CPT | Performed by: STUDENT IN AN ORGANIZED HEALTH CARE EDUCATION/TRAINING PROGRAM

## 2024-12-09 PROCEDURE — 99214 OFFICE O/P EST MOD 30 MIN: CPT | Performed by: STUDENT IN AN ORGANIZED HEALTH CARE EDUCATION/TRAINING PROGRAM

## 2024-12-09 PROCEDURE — 3079F DIAST BP 80-89 MM HG: CPT | Performed by: STUDENT IN AN ORGANIZED HEALTH CARE EDUCATION/TRAINING PROGRAM

## 2024-12-09 RX ORDER — SEMAGLUTIDE 2.68 MG/ML
INJECTION, SOLUTION SUBCUTANEOUS
Qty: 9 ML | Refills: 1 | Status: SHIPPED | OUTPATIENT
Start: 2024-12-09

## 2024-12-09 RX ORDER — PANTOPRAZOLE SODIUM 20 MG/1
20 TABLET, DELAYED RELEASE ORAL DAILY
Qty: 90 TABLET | Refills: 1 | Status: SHIPPED | OUTPATIENT
Start: 2024-12-09

## 2024-12-09 RX ORDER — AMLODIPINE BESYLATE 5 MG/1
5 TABLET ORAL DAILY
Qty: 90 TABLET | Refills: 1 | Status: SHIPPED | OUTPATIENT
Start: 2024-12-09

## 2024-12-09 RX ORDER — ROSUVASTATIN CALCIUM 5 MG/1
5 TABLET, COATED ORAL
COMMUNITY
Start: 2024-11-15

## 2024-12-09 RX ORDER — LORATADINE 10 MG/1
10 TABLET ORAL
Qty: 90 TABLET | Refills: 3 | Status: SHIPPED | OUTPATIENT
Start: 2024-12-09

## 2024-12-09 RX ORDER — PREGABALIN 75 MG/1
75 CAPSULE ORAL
COMMUNITY
Start: 2024-11-26 | End: 2024-12-26

## 2024-12-09 RX ORDER — MONTELUKAST SODIUM 10 MG/1
10 TABLET ORAL DAILY
Qty: 90 TABLET | Refills: 1 | Status: SHIPPED | OUTPATIENT
Start: 2024-12-09

## 2024-12-09 RX ORDER — SUMATRIPTAN SUCCINATE 100 MG/1
TABLET ORAL
Qty: 9 TABLET | Refills: 2 | Status: SHIPPED | OUTPATIENT
Start: 2024-12-09

## 2024-12-09 RX ORDER — FLUTICASONE PROPIONATE AND SALMETEROL 100; 50 UG/1; UG/1
1 POWDER RESPIRATORY (INHALATION) 2 TIMES DAILY
Qty: 180 EACH | Refills: 1 | Status: SHIPPED | OUTPATIENT
Start: 2024-12-09

## 2024-12-09 ASSESSMENT — ENCOUNTER SYMPTOMS
COUGH: 0
BACK PAIN: 1
SHORTNESS OF BREATH: 0
ABDOMINAL PAIN: 0

## 2024-12-09 NOTE — PROGRESS NOTES
\"Have you been to the ER, urgent care clinic since your last visit?  Hospitalized since your last visit?\"    NO    “Have you seen or consulted any other health care providers outside of [facility] since your last visit?”    Follow up with cardiologist Nov 2024     Chief Complaint   Patient presents with    Follow-up Chronic Condition     BP (!) 150/87 (Site: Left Lower Arm, Position: Sitting, Cuff Size: Medium Adult)   Pulse 61   Temp 97.8 °F (36.6 °C) (Temporal)   Resp 18   Ht 1.727 m (5' 8\")   Wt (!) 155.6 kg (343 lb)   SpO2 99%   BMI 52.15 kg/m²             Click Here for Release of Records Request

## 2024-12-09 NOTE — PROGRESS NOTES
Luisa Smyth (: 1975) is a 49 y.o. female, Established patient patient, here for evaluation of the following chief complaint(s):  Follow-up Chronic Condition       ASSESSMENT/PLAN:  Below is the assessment and plan developed based on review of pertinent history, physical exam, labs, studies, and medications.    1. Essential hypertension  -     CBC; Future  -     Comprehensive Metabolic Panel; Future  -     amLODIPine (NORVASC) 5 MG tablet; Take 1 tablet by mouth daily, Disp-90 tablet, R-1Normal  2. Type 2 diabetes mellitus without complication, without long-term current use of insulin (HCC)  -     Comprehensive Metabolic Panel; Future  -     Hemoglobin A1C; Future  -     semaglutide, 2 MG/DOSE, (OZEMPIC, 2 MG/DOSE,) 8 MG/3ML SOPN sc injection; INJECT 2 MG UNDER THE SKIN ONE DAY A WEEK, Disp-9 mL, R-1Normal  3. Obstructive sleep apnea syndrome  4. Hyperlipidemia, unspecified hyperlipidemia type  5. Body mass index (BMI) 50.0-59.9, adult  -     [facility] - Ileana Rodríguez MD, Bariatrics (non Surgical), [facility] ([address])  -     semaglutide, 2 MG/DOSE, (OZEMPIC, 2 MG/DOSE,) 8 MG/3ML SOPN sc injection; INJECT 2 MG UNDER THE SKIN ONE DAY A WEEK, Disp-9 mL, R-1Normal  6. Other migraine without status migrainosus, not intractable  -     SUMAtriptan (IMITREX) 100 MG tablet; Take once for headache, can repeat dose after 2 hours if needed., Disp-9 tablet, R-2Normal  7. Moderate persistent asthma without complication  -     fluticasone-salmeterol (WIXELA INHUB) 100-50 MCG/ACT AEPB diskus inhaler; Inhale 1 puff into the lungs 2 times daily, Disp-180 each, R-1Normal  -     montelukast (SINGULAIR) 10 MG tablet; Take 1 tablet by mouth daily, Disp-90 tablet, R-1Normal  8. Allergic rhinitis, unspecified seasonality, unspecified trigger  -     loratadine (CLARITIN) 10 MG tablet; Take 1 tablet by mouth nightly, Disp-90 tablet, R-3Normal  9. Gastroesophageal reflux disease, unspecified whether

## 2024-12-10 LAB
ALBUMIN SERPL-MCNC: 4.2 G/DL (ref 3.9–4.9)
ALP SERPL-CCNC: 97 IU/L (ref 44–121)
ALT SERPL-CCNC: 20 IU/L (ref 0–32)
AST SERPL-CCNC: 16 IU/L (ref 0–40)
BILIRUB SERPL-MCNC: 0.3 MG/DL (ref 0–1.2)
BUN SERPL-MCNC: 11 MG/DL (ref 6–24)
BUN/CREAT SERPL: 13 (ref 9–23)
CALCIUM SERPL-MCNC: 8.9 MG/DL (ref 8.7–10.2)
CHLORIDE SERPL-SCNC: 103 MMOL/L (ref 96–106)
CO2 SERPL-SCNC: 25 MMOL/L (ref 20–29)
CREAT SERPL-MCNC: 0.85 MG/DL (ref 0.57–1)
EGFRCR SERPLBLD CKD-EPI 2021: 84 ML/MIN/1.73
ERYTHROCYTE [DISTWIDTH] IN BLOOD BY AUTOMATED COUNT: 15.3 % (ref 11.7–15.4)
GLOBULIN SER CALC-MCNC: 3.2 G/DL (ref 1.5–4.5)
GLUCOSE SERPL-MCNC: 85 MG/DL (ref 70–99)
HBA1C MFR BLD: 6 % (ref 4.8–5.6)
HCT VFR BLD AUTO: 40.8 % (ref 34–46.6)
HGB BLD-MCNC: 12.5 G/DL (ref 11.1–15.9)
MCH RBC QN AUTO: 26.5 PG (ref 26.6–33)
MCHC RBC AUTO-ENTMCNC: 30.6 G/DL (ref 31.5–35.7)
MCV RBC AUTO: 87 FL (ref 79–97)
PLATELET # BLD AUTO: 269 X10E3/UL (ref 150–450)
POTASSIUM SERPL-SCNC: 4.5 MMOL/L (ref 3.5–5.2)
PROT SERPL-MCNC: 7.4 G/DL (ref 6–8.5)
RBC # BLD AUTO: 4.71 X10E6/UL (ref 3.77–5.28)
SODIUM SERPL-SCNC: 141 MMOL/L (ref 134–144)
WBC # BLD AUTO: 8.7 X10E3/UL (ref 3.4–10.8)

## 2024-12-11 ENCOUNTER — TELEPHONE (OUTPATIENT)
Age: 49
End: 2024-12-11

## 2024-12-11 NOTE — TELEPHONE ENCOUNTER
Called patient regarding referral to our [facility]. Patient did not answer so I left a vmail with our contact information.   
Clear

## 2025-01-09 ENCOUNTER — TELEPHONE (OUTPATIENT)
Age: 50
End: 2025-01-09

## 2025-01-09 NOTE — TELEPHONE ENCOUNTER
Called patient regarding referral to our [facility]. Patient did not answer so I left a vmail with our contact information.

## 2025-02-07 ENCOUNTER — OFFICE VISIT (OUTPATIENT)
Facility: CLINIC | Age: 50
End: 2025-02-07
Payer: COMMERCIAL

## 2025-02-07 VITALS
OXYGEN SATURATION: 100 % | HEIGHT: 68 IN | DIASTOLIC BLOOD PRESSURE: 87 MMHG | HEART RATE: 69 BPM | TEMPERATURE: 97.7 F | BODY MASS INDEX: 44.41 KG/M2 | RESPIRATION RATE: 16 BRPM | SYSTOLIC BLOOD PRESSURE: 142 MMHG | WEIGHT: 293 LBS

## 2025-02-07 DIAGNOSIS — R68.89 COLD INTOLERANCE: Primary | ICD-10-CM

## 2025-02-07 DIAGNOSIS — E55.9 VITAMIN D DEFICIENCY: ICD-10-CM

## 2025-02-07 DIAGNOSIS — M25.50 POLYARTHRALGIA: ICD-10-CM

## 2025-02-07 DIAGNOSIS — I10 ESSENTIAL HYPERTENSION: ICD-10-CM

## 2025-02-07 DIAGNOSIS — E61.1 IRON DEFICIENCY: Primary | ICD-10-CM

## 2025-02-07 DIAGNOSIS — R68.89 COLD INTOLERANCE: ICD-10-CM

## 2025-02-07 PROCEDURE — 99214 OFFICE O/P EST MOD 30 MIN: CPT | Performed by: STUDENT IN AN ORGANIZED HEALTH CARE EDUCATION/TRAINING PROGRAM

## 2025-02-07 PROCEDURE — 3079F DIAST BP 80-89 MM HG: CPT | Performed by: STUDENT IN AN ORGANIZED HEALTH CARE EDUCATION/TRAINING PROGRAM

## 2025-02-07 PROCEDURE — 3077F SYST BP >= 140 MM HG: CPT | Performed by: STUDENT IN AN ORGANIZED HEALTH CARE EDUCATION/TRAINING PROGRAM

## 2025-02-07 SDOH — ECONOMIC STABILITY: FOOD INSECURITY: WITHIN THE PAST 12 MONTHS, YOU WORRIED THAT YOUR FOOD WOULD RUN OUT BEFORE YOU GOT MONEY TO BUY MORE.: NEVER TRUE

## 2025-02-07 SDOH — ECONOMIC STABILITY: FOOD INSECURITY: WITHIN THE PAST 12 MONTHS, THE FOOD YOU BOUGHT JUST DIDN'T LAST AND YOU DIDN'T HAVE MONEY TO GET MORE.: NEVER TRUE

## 2025-02-07 ASSESSMENT — PATIENT HEALTH QUESTIONNAIRE - PHQ9
SUM OF ALL RESPONSES TO PHQ QUESTIONS 1-9: 0
1. LITTLE INTEREST OR PLEASURE IN DOING THINGS: NOT AT ALL
2. FEELING DOWN, DEPRESSED OR HOPELESS: NOT AT ALL
SUM OF ALL RESPONSES TO PHQ QUESTIONS 1-9: 0
SUM OF ALL RESPONSES TO PHQ QUESTIONS 1-9: 0
SUM OF ALL RESPONSES TO PHQ9 QUESTIONS 1 & 2: 0
SUM OF ALL RESPONSES TO PHQ QUESTIONS 1-9: 0

## 2025-02-07 ASSESSMENT — ENCOUNTER SYMPTOMS: SHORTNESS OF BREATH: 0

## 2025-02-07 NOTE — PROGRESS NOTES
Luisa Smyth (: 1975) is a 49 y.o. female, Established patient patient, here for evaluation of the following chief complaint(s):  always cold, can't get warm and Fatigue       ASSESSMENT/PLAN:  Below is the assessment and plan developed based on review of pertinent history, physical exam, labs, studies, and medications.    1. Cold intolerance  -     CBC; Future  -     Ferritin; Future  -     Iron and TIBC; Future  -     Vitamin B12; Future  -     TSH; Future  -     T4, Free; Future  2. Essential hypertension  3. Vitamin D deficiency  -     Vitamin D 25 Hydroxy; Future  4. Polyarthralgia  -     Vitamin B12; Future  -     SHANTELL BY IFA W/REFLEX; Future    Cold intolerance: Differentials include hypothyroidism, perimenopausal symptoms, iron deficiency, vitamin deficiency, check blood work as above.  Recommend to start taking a multivitamin.    Feels like her joint pains are worsened during the cold, SHANTELL ordered to rule out autoimmune etiology.  Blood pressure is high, but she has not taken her medications this morning.  Continue monitoring at home.        SUBJECTIVE/OBJECTIVE:  HPI    Luisa Smyth is a 49-year-old presents to clinic for an acute visit.  She c/o cold intolerance since at least 6 months, in multiple settings.   She states even she has space heater, she still feels cold.   She is like her hands are cold, and feels cold all over.  Denies any pain or change in color of fingers.  No history of smoking.  No known history of thyroid disease.  She is in the perimenopausal state.  Also complains of chronic fatigue, she does have a CPAP machine, not seen a pulmonologist recently in regards to her settings.  No fever or other symptoms.    She has hypertension, diabetes, hyperlipidemia.  Recent A1c in December was good.  Allergies   Allergen Reactions    Lisinopril Angioedema    Penicillins Hives     Current Outpatient Medications   Medication Sig Dispense Refill    pregabalin (LYRICA) 75 MG

## 2025-02-07 NOTE — PROGRESS NOTES
Chief Complaint   Patient presents with    always cold, can't get warm    Fatigue           BP (!) 142/87 (Site: Left Upper Arm, Position: Sitting)   Pulse 69   Temp 97.7 °F (36.5 °C)   Resp 16   Ht 1.727 m (5' 8\")   Wt (!) 153.8 kg (339 lb)   SpO2 100%   BMI 51.54 kg/m²         \"Have you been to the ER, urgent care clinic since your last visit?  Hospitalized since your last visit?\"    NO    “Have you seen or consulted any other health care providers outside our system since your last visit?”    NO      “Have you had a diabetic eye exam?”    YES - Where: 11/2024 Nurse/CMA to request most recent records if not in the chart     Date of last diabetic eye exam: 9/18/2023

## 2025-02-08 LAB
25(OH)D3+25(OH)D2 SERPL-MCNC: 7 NG/ML (ref 30–100)
ERYTHROCYTE [DISTWIDTH] IN BLOOD BY AUTOMATED COUNT: 15.8 % (ref 11.7–15.4)
FERRITIN SERPL-MCNC: 76 NG/ML (ref 15–150)
HCT VFR BLD AUTO: 41 % (ref 34–46.6)
HGB BLD-MCNC: 12.4 G/DL (ref 11.1–15.9)
IRON SATN MFR SERPL: 12 % (ref 15–55)
IRON SERPL-MCNC: 36 UG/DL (ref 27–159)
MCH RBC QN AUTO: 26.4 PG (ref 26.6–33)
MCHC RBC AUTO-ENTMCNC: 30.2 G/DL (ref 31.5–35.7)
MCV RBC AUTO: 87 FL (ref 79–97)
PLATELET # BLD AUTO: 277 X10E3/UL (ref 150–450)
RBC # BLD AUTO: 4.69 X10E6/UL (ref 3.77–5.28)
T4 FREE SERPL-MCNC: 1.2 NG/DL (ref 0.82–1.77)
TIBC SERPL-MCNC: 305 UG/DL (ref 250–450)
TSH SERPL DL<=0.005 MIU/L-ACNC: 1.43 UIU/ML (ref 0.45–4.5)
UIBC SERPL-MCNC: 269 UG/DL (ref 131–425)
WBC # BLD AUTO: 8.2 X10E3/UL (ref 3.4–10.8)

## 2025-02-09 LAB — VIT B12 SERPL-MCNC: 643 PG/ML (ref 232–1245)

## 2025-02-13 RX ORDER — ERGOCALCIFEROL 1.25 MG/1
50000 CAPSULE, LIQUID FILLED ORAL WEEKLY
Qty: 12 CAPSULE | Refills: 0 | Status: SHIPPED | OUTPATIENT
Start: 2025-02-13

## 2025-02-13 RX ORDER — FERROUS SULFATE 325(65) MG
325 TABLET ORAL
Qty: 90 TABLET | Refills: 0 | Status: SHIPPED | OUTPATIENT
Start: 2025-02-13

## 2025-02-14 LAB
ANA SER QL IF: NEGATIVE
LABORATORY COMMENT REPORT: NORMAL

## 2025-02-18 LAB — LEFT VENTRICULAR EJECTION FRACTION, EXTERNAL: NORMAL

## 2025-05-12 ENCOUNTER — OFFICE VISIT (OUTPATIENT)
Facility: CLINIC | Age: 50
End: 2025-05-12
Payer: COMMERCIAL

## 2025-05-12 VITALS
HEIGHT: 68 IN | SYSTOLIC BLOOD PRESSURE: 136 MMHG | BODY MASS INDEX: 44.41 KG/M2 | HEART RATE: 82 BPM | RESPIRATION RATE: 16 BRPM | WEIGHT: 293 LBS | OXYGEN SATURATION: 98 % | DIASTOLIC BLOOD PRESSURE: 85 MMHG

## 2025-05-12 DIAGNOSIS — L02.91 ABSCESS: ICD-10-CM

## 2025-05-12 DIAGNOSIS — S76.912A MUSCLE STRAIN OF LEFT THIGH, INITIAL ENCOUNTER: Primary | ICD-10-CM

## 2025-05-12 PROCEDURE — 3079F DIAST BP 80-89 MM HG: CPT | Performed by: NURSE PRACTITIONER

## 2025-05-12 PROCEDURE — 3075F SYST BP GE 130 - 139MM HG: CPT | Performed by: NURSE PRACTITIONER

## 2025-05-12 PROCEDURE — 99214 OFFICE O/P EST MOD 30 MIN: CPT | Performed by: NURSE PRACTITIONER

## 2025-05-12 RX ORDER — CYCLOBENZAPRINE HCL 10 MG
10 TABLET ORAL 3 TIMES DAILY PRN
Qty: 21 TABLET | Refills: 0 | Status: SHIPPED | OUTPATIENT
Start: 2025-05-12 | End: 2025-05-22

## 2025-05-12 RX ORDER — NYSTATIN 100000 [USP'U]/G
POWDER TOPICAL 4 TIMES DAILY
Qty: 60 G | Refills: 1 | Status: SHIPPED | OUTPATIENT
Start: 2025-05-12

## 2025-05-12 RX ORDER — SULFAMETHOXAZOLE AND TRIMETHOPRIM 800; 160 MG/1; MG/1
1 TABLET ORAL 2 TIMES DAILY
Qty: 14 TABLET | Refills: 0 | Status: SHIPPED | OUTPATIENT
Start: 2025-05-12 | End: 2025-05-19

## 2025-05-12 ASSESSMENT — ENCOUNTER SYMPTOMS
COLOR CHANGE: 0
SORE THROAT: 0
EYE DISCHARGE: 0
EYE PAIN: 0
EYE REDNESS: 0
CHEST TIGHTNESS: 0
ABDOMINAL PAIN: 0
TROUBLE SWALLOWING: 0
WHEEZING: 0
COUGH: 0
NAUSEA: 0
VOMITING: 0
SHORTNESS OF BREATH: 0

## 2025-05-12 NOTE — ASSESSMENT & PLAN NOTE
New, not at goal (unstable), changes made today: Flexeril muscle relaxant as needed only for breakthrough spasms, initiate RICE protocol, medication adherence emphasized, and lifestyle modifications recommended    Orders:    cyclobenzaprine (FLEXERIL) 10 MG tablet; Take 1 tablet by mouth 3 times daily as needed for Muscle spasms

## 2025-05-12 NOTE — PROGRESS NOTES
Chief Complaint   Patient presents with    Skin Problem     boil on bottom that popped and not healing and left leg going stiff               /85 (BP Site: Left Upper Arm, Patient Position: Sitting)   Pulse 82   Resp 16   Ht 1.727 m (5' 8\")   Wt (!) 152.9 kg (337 lb)   SpO2 98%   BMI 51.24 kg/m²         Have you been to the ER, urgent care clinic since your last visit?  Hospitalized since your last visit?   NO    Have you seen or consulted any other health care providers outside our system since your last visit?   NO      “Have you had a diabetic eye exam?”    NO     Date of last diabetic eye exam: 9/18/2023

## 2025-05-12 NOTE — PROGRESS NOTES
Subjective  Chief Complaint   Patient presents with    Skin Problem     boil on bottom that popped and not healing and left leg going stiff         HPI:  Luisa Smyth is a 49 y.o. female with medical history as reviewed and included.     Patient presents to the clinic for an acute care for evaluation of worsening left groin pain, patient previously prescribed gabapentin, however she reports little to no response with lower extremity neuralgia symptoms.  Patient currently endorses worsening left lower leg abnormal sensations, heaviness and reduced mobility.  High clinical suspicion for muscle strain, prescribing short course of Flexeril, advised patient to initiate RICE protocol.    Patient also endorses having an abscess on left gluteal area, closer to the gluteal fold.  During clinical examination noted quarter-sized raised area, sensitive to palpation, negative for drainage. Ordering nystatin powder to apply to groin area secondary to increased pruritus and redness. Prescribing oral antibiotic Bactrim and topical mupirocin.        Review of Systems   Constitutional:  Negative for chills, fatigue and fever.   HENT:  Negative for ear pain, hearing loss, sore throat and trouble swallowing.    Eyes:  Negative for pain, discharge and redness.   Respiratory:  Negative for cough, chest tightness, shortness of breath and wheezing.    Gastrointestinal:  Negative for abdominal pain, nausea and vomiting.   Endocrine: Negative for cold intolerance and heat intolerance.   Genitourinary:  Negative for dysuria and flank pain.   Skin:  Negative for color change and rash.        Left gluteal abscess   Neurological:  Positive for weakness (Bilateral worse left side). Negative for dizziness, seizures, light-headedness and headaches.   Psychiatric/Behavioral:  Negative for agitation and confusion.         Past Medical History:   Diagnosis Date    Asthma     Cerebrovascular disease     Chronic pain     Contact dermatitis and

## 2025-05-15 RX ORDER — MUPIROCIN 20 MG/G
OINTMENT TOPICAL
Qty: 30 G | Refills: 1 | Status: SHIPPED | OUTPATIENT
Start: 2025-05-15 | End: 2025-05-22

## 2025-05-23 ENCOUNTER — TELEPHONE (OUTPATIENT)
Facility: CLINIC | Age: 50
End: 2025-05-23

## 2025-05-23 RX ORDER — FLUCONAZOLE 100 MG/1
100 TABLET ORAL DAILY
Qty: 3 TABLET | Refills: 0 | Status: SHIPPED | OUTPATIENT
Start: 2025-05-23 | End: 2025-05-26

## 2025-05-23 NOTE — TELEPHONE ENCOUNTER
Patient called stating that she saw you on 5/12/2025 and you prescribed her and antibiotic and she just finished taking it and she forgot to tell you that when she takes antibiotics it causes her yeast infections with irritation and that she would need Diflucan sent in.    Now she has a yeast infection and some irritation and wants to know if you can send Diflucan to the pharmacy.      Please send to [facility] on [address] in [place].    Thanks

## 2025-08-19 ENCOUNTER — HOSPITAL ENCOUNTER (OUTPATIENT)
Facility: HOSPITAL | Age: 50
Discharge: HOME | End: 2025-08-22
Payer: COMMERCIAL

## 2025-08-19 DIAGNOSIS — Z12.31 VISIT FOR SCREENING MAMMOGRAM: ICD-10-CM

## 2025-08-19 PROCEDURE — 77063 BREAST TOMOSYNTHESIS BI: CPT

## 2025-08-26 DIAGNOSIS — E11.9 TYPE 2 DIABETES MELLITUS WITHOUT COMPLICATION, WITHOUT LONG-TERM CURRENT USE OF INSULIN (HCC): ICD-10-CM

## 2025-08-27 RX ORDER — SEMAGLUTIDE 2.68 MG/ML
INJECTION, SOLUTION SUBCUTANEOUS
Qty: 9 ML | Refills: 1 | Status: SHIPPED | OUTPATIENT
Start: 2025-08-27

## (undated) DEVICE — SOLIDIFIER MEDC 1200ML -- CONVERT TO 356117

## (undated) DEVICE — SENSOR OXMTR SPO2 ADLT NELLCOR DISP

## (undated) DEVICE — CUFF RMFG BP INF SZ 11 DISP -- LAWSON OEM ITEM 238915

## (undated) DEVICE — SET GRAV CK VLV NEEDLESS ST 3 GANGED 4WAY STPCOCK HI FLO 10

## (undated) DEVICE — BAG BELONG PT PERS CLEAR HANDL

## (undated) DEVICE — Device

## (undated) DEVICE — ELECTRODE,RADIOTRANSLUCENT,FOAM,3PK: Brand: MEDLINE

## (undated) DEVICE — CANN NASAL O2 CAPNOGRAPHY AD -- FILTERLINE

## (undated) DEVICE — 1200 GUARD II KIT W/5MM TUBE W/O VAC TUBE: Brand: GUARDIAN

## (undated) DEVICE — 3M™ CUROS™ DISINFECTING CAP FOR NEEDLELESS CONNECTORS 270/CARTON 20 CARTONS/CASE CFF1-270: Brand: CUROS™

## (undated) DEVICE — CATH IV AUTOGRD BC PNK 20GA 25 -- INSYTE

## (undated) DEVICE — KIT COLON DUAL END BRSH W/LUBE -- CUSTOM BX/20 FORMERLY KS-18-20

## (undated) DEVICE — SIMPLICITY FLUFF UNDERPAD 23X36, MODERATE: Brand: SIMPLICITY